# Patient Record
Sex: FEMALE | Race: WHITE | NOT HISPANIC OR LATINO | Employment: FULL TIME | URBAN - METROPOLITAN AREA
[De-identification: names, ages, dates, MRNs, and addresses within clinical notes are randomized per-mention and may not be internally consistent; named-entity substitution may affect disease eponyms.]

---
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
## 2019-01-03 ENCOUNTER — MEDICAL CORRESPONDENCE (OUTPATIENT)
Dept: HEALTH INFORMATION MANAGEMENT | Facility: CLINIC | Age: 19
End: 2019-01-03

## 2019-01-03 ENCOUNTER — TRANSFERRED RECORDS (OUTPATIENT)
Dept: HEALTH INFORMATION MANAGEMENT | Facility: CLINIC | Age: 19
End: 2019-01-03

## 2019-02-14 ENCOUNTER — MYC REFILL (OUTPATIENT)
Dept: FAMILY MEDICINE | Facility: CLINIC | Age: 19
End: 2019-02-14

## 2019-02-15 NOTE — TELEPHONE ENCOUNTER
[phone]- david      Left message on answering machine for patient to call back.  Patient needs appt.     Thank you    Amanda UMANZOR RN

## 2019-02-18 NOTE — TELEPHONE ENCOUNTER
Patient states the prescription goes to another provider. This was a mistake to have it sent to .    Amanda UMANZOR RN

## 2019-10-10 NOTE — TELEPHONE ENCOUNTER
RECORDS RECEIVED FROM: Internal   DATE RECEIVED: 10.16.19   NOTES STATUS DETAILS   OFFICE NOTE from referring provider Internal 1.3.19 Francoise Aguayo   OFFICE NOTE from other specialist Internal 11.5.13 Dr. Germain  5.3.13 Dr. Germain   DISCHARGE SUMMARY from hospital N/A    DISCHARGE REPORT from the ER Internal 4.26.13 [facility] ED   OPERATIVE REPORT N/A    MEDICATION LIST Internal    MRI N/A    CT SCAN Care Everywhere Requested    1.22.19-[facility]   XRAYS (IMAGES & REPORTS) Internal/external   1.3.19 Right ankle  4.26.13 Right ankle

## 2019-10-16 ENCOUNTER — PRE VISIT (OUTPATIENT)
Dept: ORTHOPEDICS | Facility: CLINIC | Age: 19
End: 2019-10-16

## 2019-10-16 ENCOUNTER — OFFICE VISIT (OUTPATIENT)
Dept: ORTHOPEDICS | Facility: CLINIC | Age: 19
End: 2019-10-16
Payer: COMMERCIAL

## 2019-10-16 VITALS — BODY MASS INDEX: 29.77 KG/M2 | WEIGHT: 201 LBS | RESPIRATION RATE: 16 BRPM | HEIGHT: 69 IN

## 2019-10-16 DIAGNOSIS — G89.29 CHRONIC PAIN OF RIGHT ANKLE: Primary | ICD-10-CM

## 2019-10-16 DIAGNOSIS — M25.571 CHRONIC PAIN OF RIGHT ANKLE: Primary | ICD-10-CM

## 2019-10-16 RX ORDER — TRIAMCINOLONE ACETONIDE 55 UG/1
2 SPRAY, METERED NASAL DAILY
Status: ON HOLD | COMMUNITY
End: 2024-05-05

## 2019-10-16 RX ORDER — ESCITALOPRAM OXALATE 10 MG/1
TABLET ORAL
COMMUNITY
Start: 2019-09-16 | End: 2020-04-16

## 2019-10-16 ASSESSMENT — MIFFLIN-ST. JEOR: SCORE: 1746.11

## 2019-10-16 NOTE — PROGRESS NOTES
" Subjective:   Stacy Tejeda is a 19 year old female who presents with right medial ankle pain. In 2013 she jumped off a tire swing in 8 th grade, not sure how she fell. She is going to school.   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  Seen over at [facility].  Getting more difficult to get around.  Mostly doing walking around campus.    CT Ankle 1/21/2019  IMPRESSION:    1. Chronic appearing 0.4 x 0.2 x 0.4 cm subchondral cortical defect in the posterior medial tibial plafond and medial malleolus may represent old posttraumatic deformity. Mild marginal hypertrophic changes in the medial ankle mortise. No evidence of significant joint space narrowing or other evidence of significant arthropathy.    2. Normal variant os trigonum minimal cortical irregularity between the osseous fragment posterior talus.  Sometimes does PT exercises, seems plenty strong.    Background:   Date of injury: 2013   Duration of symptoms: 1 years  Mechanism of Injury: Chronic; Activity Related   Aggravating factors: walking, activity   Relieving Factors: massage  Prior Evaluation: Prior Physician Evalutation:  at [facility], X-rays and Injection 2/8/19    PAST MEDICAL, SOCIAL, SURGICAL AND FAMILY HISTORY: She  has a past medical history of Bordetella pertussis (4/28/2009) and Fracture of posterior malleolus (5/20/2013).  She  has no past surgical history on file.  Her family history includes Hypertension in her mother.  She reports that she has never smoked. She has never used smokeless tobacco. She reports that she does not drink alcohol or use drugs.    ALLERGIES: She is allergic to augmentin.    CURRENT MEDICATIONS: She has a current medication list which includes the following prescription(s): triamcinolone, buspirone, cetirizine, citalopram, and escitalopram.     REVIEW OF SYSTEMS: 10 point review of systems is negative except as noted above.     Exam:   Resp 16   Ht 1.753 m (5' 9\")   Wt 91.2 kg (201 lb)   BMI 29.68 kg/m           "   CONSTITUTIONAL: healthy, alert, no distress and cooperative  HEAD: Normocephalic. No masses, lesions, tenderness or abnormalities  SKIN: no suspicious lesions or rashes  GAIT: normal  NEUROLOGIC: Non-focal, Normal muscle tone and strength, reflexes normal, sensation grossly normal.  PSYCHIATRIC: affect normal/bright and mentation appears normal.    MUSCULOSKELETAL: right ankle pain    ANKLE  Inspection/Palpation: no ecchymosis, no swelling     Swelling: no swelling   Tender: posterior ankle, mild achilles tenderness     Non-tender: ATFL, CFL, PTFL, medial malleolus, deltoid ligament, anterior tib-fib ligament, no achilles tendon defect   Range of Motion: dorsiflexion: full, plantarflexion: full, inversion: full, eversion: full  Strength:dorsiflexion: 5/5, plantarflexion: 5/5, inversion: 5/5, eversion: 5/5   Special tests: negative anterior drawer, negative varus stress, negative valgus stress, negative forced external rotation, negative Jacob sign     FOOT  Inspection/Palpation:      Swelling: no swelling     Non-tender: promixal 5th metatarsal, 1st, 2nd, 3rd, 4th, 5th metatarsals, calcaneous, cuboid,  navicular, cuneiform lateral, cuneiform middle, cuneiform medial, metatarsal heads, peroneal tendon: at lateral malleolus, at cuboid, at proximal 5th metatarsal, posterior tibial tendon at medial malleolus, posterior tibial tendon at navicular, plantar fascia  Range of Motion: flexion of toes: full, extension of toes: full         Assessment/Plan:   Pt is a 18 yo white female with PMhx of right posterior ankle fracture presenting with chronic right ankle pain  1. Chronic right ankle pain- XR injection ordered  Had this procedure at [facility] and was able to get months of relief, here for orders to get repeat injection    RTC prn    X-RAY INTERPRETATION:   Reviewed past imaging from [facility]

## 2019-10-16 NOTE — LETTER
10/16/2019      RE: Stacy Tejeda  [address] [address]        Subjective:   Stacy Tejeda is a 19 year old female who presents with right medial ankle pain. In 2013 she jumped off a tire swing in 8 th grade, not sure how she fell. She is going to school.   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  Seen over at [facility].  Getting more difficult to get around.  Mostly doing walking around campus.    CT Ankle 1/21/2019  IMPRESSION:    1. Chronic appearing 0.4 x 0.2 x 0.4 cm subchondral cortical defect in the posterior medial tibial plafond and medial malleolus may represent old posttraumatic deformity. Mild marginal hypertrophic changes in the medial ankle mortise. No evidence of significant joint space narrowing or other evidence of significant arthropathy.    2. Normal variant os trigonum minimal cortical irregularity between the osseous fragment posterior talus.  Sometimes does PT exercises, seems plenty strong.    Background:   Date of injury: 2013   Duration of symptoms: 1 years  Mechanism of Injury: Chronic; Activity Related   Aggravating factors: walking, activity   Relieving Factors: massage  Prior Evaluation: Prior Physician Evalutation:  at [facility], X-rays and Injection 2/8/19    PAST MEDICAL, SOCIAL, SURGICAL AND FAMILY HISTORY: She  has a past medical history of Bordetella pertussis (4/28/2009) and Fracture of posterior malleolus (5/20/2013).  She  has no past surgical history on file.  Her family history includes Hypertension in her mother.  She reports that she has never smoked. She has never used smokeless tobacco. She reports that she does not drink alcohol or use drugs.    ALLERGIES: She is allergic to augmentin.    CURRENT MEDICATIONS: She has a current medication list which includes the following prescription(s): triamcinolone, buspirone, cetirizine, citalopram, and escitalopram.     REVIEW OF SYSTEMS: 10 point review of systems is negative except as noted above.      "Exam:   Resp 16   Ht 1.753 m (5' 9\")   Wt 91.2 kg (201 lb)   BMI 29.68 kg/m              CONSTITUTIONAL: healthy, alert, no distress and cooperative  HEAD: Normocephalic. No masses, lesions, tenderness or abnormalities  SKIN: no suspicious lesions or rashes  GAIT: normal  NEUROLOGIC: Non-focal, Normal muscle tone and strength, reflexes normal, sensation grossly normal.  PSYCHIATRIC: affect normal/bright and mentation appears normal.    MUSCULOSKELETAL: right ankle pain    ANKLE  Inspection/Palpation: no ecchymosis, no swelling     Swelling: no swelling   Tender: posterior ankle, mild achilles tenderness     Non-tender: ATFL, CFL, PTFL, medial malleolus, deltoid ligament, anterior tib-fib ligament, no achilles tendon defect   Range of Motion: dorsiflexion: full, plantarflexion: full, inversion: full, eversion: full  Strength:dorsiflexion: 5/5, plantarflexion: 5/5, inversion: 5/5, eversion: 5/5   Special tests: negative anterior drawer, negative varus stress, negative valgus stress, negative forced external rotation, negative Jacob sign     FOOT  Inspection/Palpation:      Swelling: no swelling     Non-tender: promixal 5th metatarsal, 1st, 2nd, 3rd, 4th, 5th metatarsals, calcaneous, cuboid,  navicular, cuneiform lateral, cuneiform middle, cuneiform medial, metatarsal heads, peroneal tendon: at lateral malleolus, at cuboid, at proximal 5th metatarsal, posterior tibial tendon at medial malleolus, posterior tibial tendon at navicular, plantar fascia  Range of Motion: flexion of toes: full, extension of toes: full         Assessment/Plan:   Pt is a 20 yo white female with PMhx of right posterior ankle fracture presenting with chronic right ankle pain  1. Chronic right ankle pain- XR injection ordered  Had this procedure at [facility] and was able to get months of relief, here for orders to get repeat injection    RTC prn    X-RAY INTERPRETATION:   Reviewed past imaging from [facility]    Maria Ines Lee MD    "

## 2019-10-31 ENCOUNTER — ANCILLARY PROCEDURE (OUTPATIENT)
Dept: GENERAL RADIOLOGY | Facility: CLINIC | Age: 19
End: 2019-10-31
Attending: FAMILY MEDICINE
Payer: COMMERCIAL

## 2019-10-31 DIAGNOSIS — M25.571 CHRONIC PAIN OF RIGHT ANKLE: ICD-10-CM

## 2019-10-31 DIAGNOSIS — G89.29 CHRONIC PAIN OF RIGHT ANKLE: ICD-10-CM

## 2019-10-31 RX ORDER — LIDOCAINE HYDROCHLORIDE 10 MG/ML
30 INJECTION, SOLUTION EPIDURAL; INFILTRATION; INTRACAUDAL; PERINEURAL ONCE
Status: COMPLETED | OUTPATIENT
Start: 2019-10-31 | End: 2019-10-31

## 2019-10-31 RX ORDER — BUPIVACAINE HYDROCHLORIDE 2.5 MG/ML
10 INJECTION, SOLUTION EPIDURAL; INFILTRATION; INTRACAUDAL ONCE
Status: COMPLETED | OUTPATIENT
Start: 2019-10-31 | End: 2019-10-31

## 2019-10-31 RX ORDER — IOPAMIDOL 408 MG/ML
20 INJECTION, SOLUTION INTRATHECAL ONCE
Status: COMPLETED | OUTPATIENT
Start: 2019-10-31 | End: 2019-10-31

## 2019-10-31 RX ORDER — TRIAMCINOLONE ACETONIDE 40 MG/ML
40 INJECTION, SUSPENSION INTRA-ARTICULAR; INTRAMUSCULAR ONCE
Status: COMPLETED | OUTPATIENT
Start: 2019-10-31 | End: 2019-10-31

## 2019-10-31 RX ADMIN — BUPIVACAINE HYDROCHLORIDE 2 ML: 2.5 INJECTION, SOLUTION EPIDURAL; INFILTRATION; INTRACAUDAL at 13:04

## 2019-10-31 RX ADMIN — IOPAMIDOL 2 ML: 408 INJECTION, SOLUTION INTRATHECAL at 13:04

## 2019-10-31 RX ADMIN — TRIAMCINOLONE ACETONIDE 40 MG: 40 INJECTION, SUSPENSION INTRA-ARTICULAR; INTRAMUSCULAR at 13:04

## 2019-10-31 RX ADMIN — LIDOCAINE HYDROCHLORIDE 5 ML: 10 INJECTION, SOLUTION EPIDURAL; INFILTRATION; INTRACAUDAL; PERINEURAL at 13:04

## 2020-04-16 ENCOUNTER — HOSPITAL ENCOUNTER (EMERGENCY)
Facility: CLINIC | Age: 20
Discharge: HOME | End: 2020-04-16
Attending: EMERGENCY MEDICINE | Admitting: EMERGENCY MEDICINE
Payer: COMMERCIAL

## 2020-04-16 ENCOUNTER — APPOINTMENT (OUTPATIENT)
Dept: GENERAL RADIOLOGY | Facility: CLINIC | Age: 20
End: 2020-04-16
Attending: EMERGENCY MEDICINE
Payer: COMMERCIAL

## 2020-04-16 VITALS
WEIGHT: 190 LBS | RESPIRATION RATE: 17 BRPM | SYSTOLIC BLOOD PRESSURE: 139 MMHG | DIASTOLIC BLOOD PRESSURE: 94 MMHG | HEIGHT: 68 IN | TEMPERATURE: 97.9 F | HEART RATE: 112 BPM | BODY MASS INDEX: 28.79 KG/M2 | OXYGEN SATURATION: 96 %

## 2020-04-16 DIAGNOSIS — S61.340A: ICD-10-CM

## 2020-04-16 PROCEDURE — 99283 EMERGENCY DEPT VISIT LOW MDM: CPT | Mod: 25 | Performed by: EMERGENCY MEDICINE

## 2020-04-16 PROCEDURE — 25000128 H RX IP 250 OP 636: Performed by: EMERGENCY MEDICINE

## 2020-04-16 PROCEDURE — 25000125 ZZHC RX 250

## 2020-04-16 PROCEDURE — 73140 X-RAY EXAM OF FINGER(S): CPT | Mod: RT

## 2020-04-16 PROCEDURE — 90715 TDAP VACCINE 7 YRS/> IM: CPT | Performed by: EMERGENCY MEDICINE

## 2020-04-16 PROCEDURE — 25000128 H RX IP 250 OP 636

## 2020-04-16 PROCEDURE — 99284 EMERGENCY DEPT VISIT MOD MDM: CPT | Mod: Z6 | Performed by: EMERGENCY MEDICINE

## 2020-04-16 PROCEDURE — 90471 IMMUNIZATION ADMIN: CPT | Performed by: EMERGENCY MEDICINE

## 2020-04-16 RX ORDER — BUPIVACAINE HYDROCHLORIDE 2.5 MG/ML
10 INJECTION, SOLUTION INFILTRATION; PERINEURAL ONCE
Status: COMPLETED | OUTPATIENT
Start: 2020-04-16 | End: 2020-04-16

## 2020-04-16 RX ORDER — LIDOCAINE HYDROCHLORIDE 10 MG/ML
INJECTION, SOLUTION EPIDURAL; INFILTRATION; INTRACAUDAL; PERINEURAL
Status: COMPLETED
Start: 2020-04-16 | End: 2020-04-16

## 2020-04-16 RX ORDER — BUPIVACAINE HYDROCHLORIDE 2.5 MG/ML
INJECTION, SOLUTION EPIDURAL; INFILTRATION; INTRACAUDAL
Status: COMPLETED
Start: 2020-04-16 | End: 2020-04-16

## 2020-04-16 RX ORDER — LIDOCAINE HYDROCHLORIDE 10 MG/ML
10 INJECTION, SOLUTION INFILTRATION; PERINEURAL ONCE
Status: COMPLETED | OUTPATIENT
Start: 2020-04-16 | End: 2020-04-16

## 2020-04-16 RX ADMIN — CLOSTRIDIUM TETANI TOXOID ANTIGEN (FORMALDEHYDE INACTIVATED), CORYNEBACTERIUM DIPHTHERIAE TOXOID ANTIGEN (FORMALDEHYDE INACTIVATED), BORDETELLA PERTUSSIS TOXOID ANTIGEN (GLUTARALDEHYDE INACTIVATED), BORDETELLA PERTUSSIS FILAMENTOUS HEMAGGLUTININ ANTIGEN (FORMALDEHYDE INACTIVATED), BORDETELLA PERTUSSIS PERTACTIN ANTIGEN, AND BORDETELLA PERTUSSIS FIMBRIAE 2/3 ANTIGEN 0.5 ML: 5; 2; 2.5; 5; 3; 5 INJECTION, SUSPENSION INTRAMUSCULAR at 22:42

## 2020-04-16 RX ADMIN — LIDOCAINE HYDROCHLORIDE 10 ML: 10 INJECTION, SOLUTION EPIDURAL; INFILTRATION; INTRACAUDAL; PERINEURAL at 22:43

## 2020-04-16 RX ADMIN — BUPIVACAINE HYDROCHLORIDE 25 MG: 2.5 INJECTION, SOLUTION EPIDURAL; INFILTRATION; INTRACAUDAL at 22:43

## 2020-04-16 RX ADMIN — LIDOCAINE HYDROCHLORIDE 10 ML: 10 INJECTION, SOLUTION INFILTRATION; PERINEURAL at 22:43

## 2020-04-16 RX ADMIN — BUPIVACAINE HYDROCHLORIDE 25 MG: 2.5 INJECTION, SOLUTION INFILTRATION; PERINEURAL at 22:43

## 2020-04-16 ASSESSMENT — MIFFLIN-ST. JEOR: SCORE: 1680.33

## 2020-04-16 NOTE — ED AVS SNAPSHOT
[facility], [place], Emergency Department  [address] [place] [address]  Phone:  [phone]                                    Stacy Tejeda   MRN: [id number]    Department:  [facility], Emergency Department   Date of Visit:  4/16/2020           After Visit Summary Signature Page    I have received my discharge instructions, and my questions have been answered. I have discussed any challenges I see with this plan with the nurse or doctor.    ..........................................................................................................................................  Patient/Patient Representative Signature      ..........................................................................................................................................  Patient Representative Print Name and Relationship to Patient    ..................................................               ................................................  Date                                   Time    ..........................................................................................................................................  Reviewed by Signature/Title    ...................................................              ..............................................  Date                                               Time          22EPIC Rev 08/18

## 2020-04-17 NOTE — ED PROVIDER NOTES
[place] EMERGENCY DEPARTMENT ([facility])  April 16, 2020  History     Chief Complaint   Patient presents with     Hand Injury     HPI  Stacy Tejeda is a 19 year old female who presents to the Emergency Department with a foreign body stuck in her right index finger.  Patient reports approximately 20 minutes prior to arrival she was using a sewing machine and looked away for a second and accidentally had the sewing needle go into her right index finger right at the nail superficially into the skin.  She had her fiancé attempt to remove this unsuccessfully.  Thus she presents here.  She does not have significant pain at this time.  She denies any numbness, tingling, or weakness.  She has full range of motion of the finger.  It appears to be superficially through the nail and skin.  No active bleeding currently.  She is not on any anticoagulation. Last tetanus was in 2010.  She denies any other injury.  She is otherwise healthy.      PAST MEDICAL HISTORY:   Past Medical History:   Diagnosis Date     Anxiety      Bordetella pertussis 4/28/2009     Depressive disorder      Fracture of posterior malleolus 5/20/2013       PAST SURGICAL HISTORY: History reviewed. No pertinent surgical history.    Past medical history, past surgical history, medications, and allergies were reviewed with the patient. Additional pertinent items: None    FAMILY HISTORY:   Family History   Problem Relation Age of Onset     Hypertension Mother         borderline       SOCIAL HISTORY:   Social History     Tobacco Use     Smoking status: Never Smoker     Smokeless tobacco: Never Used   Substance Use Topics     Alcohol use: No     Social history was reviewed with the patient. Additional pertinent items: None      Patient's Medications   New Prescriptions    No medications on file   Previous Medications    BUSPIRONE (BUSPAR) 15 MG TABLET    Take 15 mg by mouth 3 times daily    TRIAMCINOLONE (NASACORT) 55 MCG/ACT NASAL AEROSOL    Spray  "2 sprays into both nostrils daily   Modified Medications    No medications on file   Discontinued Medications    CETIRIZINE (ZYRTEC) 10 MG TABLET    Take 10 mg by mouth daily.    CITALOPRAM (CELEXA) 10 MG TABLET    Take 10 mg by mouth daily    ESCITALOPRAM (LEXAPRO) 10 MG TABLET              Allergies   Allergen Reactions     Augmentin Nausea and Vomiting     Throws up        Review of Systems  A complete review of systems was performed with pertinent positives and negatives noted in the HPI, and all other systems negative.    Physical Exam   BP: (!) 139/94  Pulse: 112  Temp: 97.9  F (36.6  C)  Resp: 17  Height: 172.7 cm (5' 8\")  Weight: 86.2 kg (190 lb)  SpO2: 96 %      Physical Exam  Vitals signs reviewed.   Constitutional:       General: She is not in acute distress.     Appearance: She is well-developed.   HENT:      Head: Normocephalic and atraumatic.      Mouth/Throat:      Mouth: Mucous membranes are moist.   Eyes:      Extraocular Movements: Extraocular movements intact.      Pupils: Pupils are equal, round, and reactive to light.   Neck:      Musculoskeletal: Normal range of motion and neck supple.   Cardiovascular:      Rate and Rhythm: Normal rate and regular rhythm.      Pulses: Normal pulses.      Heart sounds: Normal heart sounds. No murmur.   Pulmonary:      Effort: Pulmonary effort is normal. No respiratory distress.      Breath sounds: Normal breath sounds. No wheezing or rales.   Abdominal:      General: Bowel sounds are normal. There is no distension.      Palpations: Abdomen is soft.      Tenderness: There is no abdominal tenderness.   Musculoskeletal: Normal range of motion.         General: No tenderness.      Comments: Patient has a sewing needle stuck at an angle into her right index finger nail superficially in the skin as well.  No active bleeding.  She has full range of motion of the right index finger at each MCP, PIP, and DIP joint without any pain to resistance tested individually.  " Capillary refill is less than 2 seconds.  2+ radial pulses bilaterally.  Otherwise no evidence of injury or other foreign bodies.  Compartments are soft and compressible.  She does not have any bony tenderness of the right index finger.   Skin:     General: Skin is warm and dry.      Capillary Refill: Capillary refill takes less than 2 seconds.      Findings: No rash.   Neurological:      General: No focal deficit present.      Mental Status: She is alert and oriented to person, place, and time.      GCS: GCS eye subscore is 4. GCS verbal subscore is 5. GCS motor subscore is 6.      Cranial Nerves: No cranial nerve deficit.      Sensory: No sensory deficit.      Motor: No weakness.   Psychiatric:         Mood and Affect: Mood normal.         ED Course        [facility], [place]    Foreign Body Removal    Date/Time: 5/16/2020 10:48 PM  Performed by: Nataly Shelley MD  Authorized by: Nataly Shelley MD       LOCATION     Location:  Finger    Finger location:  R index finger    Depth:  Subungual    Tendon involvement:  None      PRE-PROCEDURE DETAILS     Imaging:  X-ray    Neurovascular status: intact    ANESTHESIA (see MAR for exact dosages)     Anesthesia method:  Nerve block    Block location:  Right index finger digital block    Block needle gauge:  27 G    Block anesthetic:  Bupivacaine 0.25% w/o epi    Block technique:  Digital ring block    Block injection procedure:  Anatomic landmarks identified, introduced needle, incremental injection, negative aspiration for blood and anatomic landmarks palpated    Block outcome:  Anesthesia achieved      PROCEDURE TYPE     Procedure complexity:  Simple      PROCEDURE DETAILS     Incision length:  No incision needed    Localization method:  Visualized    Dissection of underlying tissues: no      Bloodless field: yes      Removal mechanism:  Hemostat    Foreign bodies recovered:  1    Description:  Sewing needle with thread intact     Intact foreign body removal: yes      POST-PROCEDURE DETAILS     Neurovascular status: intact      Confirmation:  No additional foreign bodies on visualization    Repair method: no closure needed. Small amount of bleeding stopped easily with pressure.    Dressing:  Non-adherent dressing                           XR Finger Right G/E 2 Views   Final Result   IMPRESSION: Metallic sewing needle seen through distal tip of the index finger. No definite osseous involvement. No acute fracture or dislocation.             Medications - No data to display          Assessments & Plan (with Medical Decision Making)   Patient presents with a sewing needle superficially through her right index finger nail into the skin very superficially.  She attempted to remove this at home without success.  Her tetanus was updated here as her last tetanus shot was in 2010 per our records.  There is no bleeding at this time.  She is neurovascularly intact.  X-ray was obtained which showed the foreign body superficially and there was no evidence of bone involvement or fracture.  No other foreign bodies noted.  Digital block was performed as described above with bupivacaine.  Then the needle was pulled out with a hemostat without difficulty.  There was some slight bleeding with this that stopped easily with pressure.  We did irrigate out the small wound.  There is a slight hole in the nail however no significant bleeding at this time and no need for repair of the nailbed.  We discussed that at this point I would not place her on empiric antibiotics as this is the very clean wound but did discuss that she should return if she developed any signs or symptoms of infection.  She was also advised that she should return if she developed any significant bleeding, fever, redness, swelling, purulent drainage, worsening pain or inability to move the finger any new or worsening concerns.  She voiced understanding and was in agreement with this plan.  She was  discharged home in stable condition.    I have reviewed the nursing notes.    I have reviewed the findings, diagnosis, plan and need for follow up with the patient.    Discharge Medication List as of 4/16/2020 10:38 PM          Final diagnoses:   Puncture wound of right index finger with foreign body and damage to nail, initial encounter       4/16/2020   [facility], [place], EMERGENCY DEPARTMENT     Nataly Shelley MD  05/16/20 [id number]

## 2020-04-17 NOTE — DISCHARGE INSTRUCTIONS
Return to the ED if you develop significant bleeding, fever, signs of infection including redness, swelling, pus drainage, worsening pain or inability to move finger, or any new or worsening concerns.

## 2022-11-08 ENCOUNTER — HOSPITAL ENCOUNTER (EMERGENCY)
Facility: CLINIC | Age: 22
Discharge: HOME | End: 2022-11-08
Attending: PHYSICIAN ASSISTANT | Admitting: PHYSICIAN ASSISTANT
Payer: COMMERCIAL

## 2022-11-08 VITALS
OXYGEN SATURATION: 99 % | SYSTOLIC BLOOD PRESSURE: 144 MMHG | RESPIRATION RATE: 16 BRPM | HEART RATE: 116 BPM | DIASTOLIC BLOOD PRESSURE: 96 MMHG | HEIGHT: 68 IN | WEIGHT: 220 LBS | TEMPERATURE: 100.6 F | BODY MASS INDEX: 33.34 KG/M2

## 2022-11-08 DIAGNOSIS — Z20.828 EXPOSURE TO INFLUENZA: ICD-10-CM

## 2022-11-08 DIAGNOSIS — H66.003 NON-RECURRENT ACUTE SUPPURATIVE OTITIS MEDIA OF BOTH EARS WITHOUT SPONTANEOUS RUPTURE OF TYMPANIC MEMBRANES: ICD-10-CM

## 2022-11-08 DIAGNOSIS — J11.1 INFLUENZA-LIKE ILLNESS: ICD-10-CM

## 2022-11-08 PROCEDURE — G0463 HOSPITAL OUTPT CLINIC VISIT: HCPCS | Performed by: PHYSICIAN ASSISTANT

## 2022-11-08 PROCEDURE — 99213 OFFICE O/P EST LOW 20 MIN: CPT | Performed by: PHYSICIAN ASSISTANT

## 2022-11-08 RX ORDER — CEFDINIR 300 MG/1
300 CAPSULE ORAL 2 TIMES DAILY
Qty: 20 CAPSULE | Refills: 0 | Status: SHIPPED | OUTPATIENT
Start: 2022-11-08 | End: 2022-11-18

## 2022-11-08 ASSESSMENT — ENCOUNTER SYMPTOMS
SORE THROAT: 1
MYALGIAS: 1
WHEEZING: 0
RHINORRHEA: 1
GASTROINTESTINAL NEGATIVE: 1
CARDIOVASCULAR NEGATIVE: 1
SHORTNESS OF BREATH: 0
SINUS PAIN: 0
EYES NEGATIVE: 1
HEADACHES: 1
FEVER: 1
COUGH: 1

## 2022-11-08 NOTE — ED PROVIDER NOTES
History     Chief Complaint   Patient presents with     Otalgia     Came down with a cold Friday, reports bilateral intermittent ear pain and  ears are plugged.     HPI  Stacy Avilez is a 22 year old female who presents the urgent care with URI symptoms that started 5 days ago.  Patient with bilateral intermittent ear pain on and off and plugged sensation since.  She has fevers that started the past few days and have been on and off.  She denies any known exposures.  Patient has been exposed to influenza through her group of friends but declined testing today stating that its not can to change anything since she is outside of the treatment window.  She states COVID test at home is negative    Allergies:  Allergies   Allergen Reactions     Augmentin Nausea and Vomiting     Throws up       Problem List:    Patient Active Problem List    Diagnosis Date Noted     White coat hypertension 08/29/2013     Priority: Medium     Elevated blood pressure reading without diagnosis of hypertension 08/29/2013     Priority: Medium     Seasonal allergic rhinitis 08/29/2013     Priority: Medium        Past Medical History:    Past Medical History:   Diagnosis Date     Anxiety      Bordetella pertussis 4/28/2009     Depressive disorder      Fracture of posterior malleolus 5/20/2013       Past Surgical History:    No past surgical history on file.    Family History:    Family History   Problem Relation Age of Onset     Hypertension Mother         borderline       Social History:  Marital Status:   [2]  Social History     Tobacco Use     Smoking status: Never     Smokeless tobacco: Never   Substance Use Topics     Alcohol use: No     Drug use: No        Medications:    cefdinir (OMNICEF) 300 MG capsule  busPIRone (BUSPAR) 15 MG tablet  triamcinolone (NASACORT) 55 MCG/ACT nasal aerosol          Review of Systems   Constitutional: Positive for fever.   HENT: Positive for congestion, ear pain, rhinorrhea and sore throat. Negative  "for sinus pain.    Eyes: Negative.    Respiratory: Positive for cough. Negative for shortness of breath and wheezing.    Cardiovascular: Negative.    Gastrointestinal: Negative.    Musculoskeletal: Positive for myalgias.   Neurological: Positive for headaches.   All other systems reviewed and are negative.      Physical Exam   BP: (!) 144/96  Pulse: 116  Temp: (!) 100.6  F (38.1  C)  Resp: 16  Height: 172.7 cm (5' 8\")  Weight: 99.8 kg (220 lb)  SpO2: 99 %      Physical Exam  Vitals and nursing note reviewed.   Constitutional:       General: She is not in acute distress.     Appearance: Normal appearance. She is normal weight. She is not toxic-appearing.   HENT:      Right Ear: Ear canal normal. Tympanic membrane is erythematous and bulging.      Left Ear: Ear canal normal. Tympanic membrane is erythematous and bulging.      Mouth/Throat:      Mouth: Mucous membranes are moist.      Pharynx: Oropharynx is clear. Posterior oropharyngeal erythema present. No oropharyngeal exudate.   Eyes:      General: No scleral icterus.     Extraocular Movements: Extraocular movements intact.      Conjunctiva/sclera: Conjunctivae normal.      Pupils: Pupils are equal, round, and reactive to light.   Cardiovascular:      Rate and Rhythm: Normal rate and regular rhythm.      Heart sounds: Normal heart sounds.   Pulmonary:      Effort: Pulmonary effort is normal.      Breath sounds: Normal breath sounds.   Musculoskeletal:      Cervical back: Neck supple. No rigidity or tenderness.   Lymphadenopathy:      Cervical: Cervical adenopathy present.   Skin:     General: Skin is warm.      Capillary Refill: Capillary refill takes less than 2 seconds.      Findings: No rash.   Neurological:      General: No focal deficit present.      Mental Status: She is alert and oriented to person, place, and time.   Psychiatric:         Mood and Affect: Mood normal.         Behavior: Behavior normal.         Thought Content: Thought content normal.         " Judgment: Judgment normal.         ED Course                 Procedures             Critical Care time:  none               No results found for this or any previous visit (from the past 24 hour(s)).    Medications - No data to display    Assessments & Plan (with Medical Decision Making)     I have reviewed the nursing notes.    I have reviewed the findings, diagnosis, plan and need for follow up with the patient.    Stacy Avilez is a 22 year old female who presents the urgent care with URI symptoms that started 5 days ago.  Patient with bilateral intermittent ear pain on and off and plugged sensation since.  She has fevers that started the past few days and have been on and off.  She denies any known exposures.  Patient has been exposed to influenza through her group of friends but declined testing today stating that its not can to change anything since she is outside of the treatment window.  She states COVID test at home is negative    See exam findings above.  Prescription cefdinir sent to the pharmacy for superior to otitis media of both ears.  She also has influenza-like illness that is likely related to her exposure to influenza from her friends.  Symptomatic treatment discussed.  Patient in agreement this plan and discharged in stable condition.    Discharge Medication List as of 11/8/2022  3:35 PM      START taking these medications    Details   cefdinir (OMNICEF) 300 MG capsule Take 1 capsule (300 mg) by mouth 2 times daily for 10 days, Disp-20 capsule, R-0, E-Prescribe             Final diagnoses:   Influenza-like illness   Non-recurrent acute suppurative otitis media of both ears without spontaneous rupture of tympanic membranes   Exposure to influenza       11/8/2022   [facility] EMERGENCY DEPT

## 2022-11-08 NOTE — DISCHARGE INSTRUCTIONS
Use medication as directed.    Follow up with PCP for recheck in 2 weeks, return sooner if symptoms worsen or change.    Increase fluids, nasal saline sprays, cool humidifier, Tylenol and ibuprofen over-the-counter as needed for fevers and pain.  Warm compresses to ears can all be beneficial.    Consider self contagious until you are fever free for 24 hours.  This could be influenza A on top of bilateral otitis media.    The emergency department symptoms worsen or change    Patient voiced understanding of instructions given.

## 2023-08-29 ENCOUNTER — OFFICE VISIT (OUTPATIENT)
Dept: PODIATRY | Facility: CLINIC | Age: 23
End: 2023-08-29
Payer: COMMERCIAL

## 2023-08-29 VITALS — DIASTOLIC BLOOD PRESSURE: 85 MMHG | HEART RATE: 81 BPM | SYSTOLIC BLOOD PRESSURE: 143 MMHG

## 2023-08-29 DIAGNOSIS — M19.071 ARTHRITIS OF ANKLE, RIGHT: Primary | ICD-10-CM

## 2023-08-29 PROBLEM — E28.2 PCOS (POLYCYSTIC OVARIAN SYNDROME): Status: ACTIVE | Noted: 2023-08-29

## 2023-08-29 PROCEDURE — 99203 OFFICE O/P NEW LOW 30 MIN: CPT | Performed by: PODIATRIST

## 2023-08-29 RX ORDER — LETROZOLE 2.5 MG/1
TABLET, FILM COATED ORAL
Status: ON HOLD | COMMUNITY
End: 2024-05-05

## 2023-08-29 RX ORDER — CEPHALEXIN 500 MG/1
CAPSULE ORAL
Status: ON HOLD | COMMUNITY
End: 2024-05-05

## 2023-08-29 RX ORDER — ESCITALOPRAM OXALATE 20 MG/1
20 TABLET ORAL DAILY
Status: ON HOLD | COMMUNITY
End: 2024-05-05

## 2023-08-29 RX ORDER — PROGESTERONE 200 MG/1
CAPSULE ORAL
Status: ON HOLD | COMMUNITY
Start: 2022-08-16 | End: 2024-07-23

## 2023-08-29 RX ORDER — LABETALOL 100 MG/1
100 TABLET, FILM COATED ORAL 2 TIMES DAILY
Status: ON HOLD | COMMUNITY
End: 2024-05-05

## 2023-08-29 RX ORDER — TRAZODONE HYDROCHLORIDE 100 MG/1
100 TABLET ORAL
Status: ON HOLD | COMMUNITY
Start: 2022-05-11 | End: 2024-05-05

## 2023-08-29 NOTE — PROGRESS NOTES
Subjective:    Patient seen today for right ankle arthritis.  Pain aggravated by activity and relieved by rest.  Patient injured this in 2013.  Has had pain ever since.  States it is deep inside her ankle more medial.  Had an injection several years ago and had no pain for a few years.  Recently she has become more active and is painful again.  Denies edema numbness or weakness.      ROS:  see above         Allergies   Allergen Reactions    Amoxicillin-Pot Clavulanate Nausea and Vomiting     Throws up       Current Outpatient Medications   Medication Sig Dispense Refill    progesterone (PROMETRIUM) 200 MG capsule TAKE 2 CAPS BY MOUTH AT BEDTIME ON CYCLE DAYS PEAK PLUS 3 THRU PEAK PLUS 12      traZODone (DESYREL) 100 MG tablet Take 100 mg by mouth      cephALEXin (KEFLEX) 500 MG capsule       escitalopram (LEXAPRO) 20 MG tablet Take 20 mg by mouth daily      labetalol (NORMODYNE) 100 MG tablet Take 100 mg by mouth 2 times daily      letrozole (FEMARA) 2.5 MG tablet       pyridOXINE (VITAMIN B6) 100 MG TABS       triamcinolone (NASACORT) 55 MCG/ACT nasal aerosol Spray 2 sprays into both nostrils daily         Patient Active Problem List   Diagnosis    White coat hypertension    Elevated blood pressure reading without diagnosis of hypertension    Seasonal allergic rhinitis    PCOS (polycystic ovarian syndrome)       Past Medical History:   Diagnosis Date    Anxiety     Bordetella pertussis 4/28/2009    Depressive disorder     Fracture of posterior malleolus 5/20/2013       No past surgical history on file.    Family History   Problem Relation Age of Onset    Hypertension Mother         borderline       Social History     Tobacco Use    Smoking status: Never    Smokeless tobacco: Never   Substance Use Topics    Alcohol use: No         Exam:    Vitals: BP (!) 143/85   Pulse 81   BMI: There is no height or weight on file to calculate BMI.  Height: Data Unavailable    Constitutional/ general:  Pt is in no apparent  distress, appears well-nourished.  Cooperative with history and physical exam.     Psych:  The patient answered questions appropriately.  Normal affect.  Seems to have reasonable expectations, in terms of treatment.     Lungs:  Non labored breathing, non labored speech. No cough.  No audible wheezing. Even, quiet breathing.       Vascular:  positive pedal pulses bilaterally for both the DP and PT arteries.  CFT < 3 sec.  negative ankle edema.  positive pedal hair growth.    Neuro:  Alert and oriented x 3. Coordinated gait.  Light touch sensation is intact      Derm: Normal texture and turgor.  No erythema, ecchymosis, or cyanosis.      Musculoskeletal:    No gross deformities.   Normal arch .  Muscle compartments intact.   Normal ROM all forefoot and rearfoot joints.  No pain around right ankle at this time.  No edema.  No pain on palpation of tendons.    Radiographic Exam:    CT Ankle Right w/o Contrast  Order: [id number]  Impression    IMPRESSION:  1. Chronic appearing 0.4 x 0.2 x 0.4 cm subchondral cortical defect in the posterior medial tibial plafond and medial malleolus may represent old posttraumatic deformity. Mild marginal hypertrophic changes in the medial ankle mortise. No evidence of significant joint space narrowing or other evidence of significant arthropathy.  2. Normal variant os trigonum minimal cortical irregularity between the osseous fragment posterior talus.  Narrative    TECHNIQUE:  Thin section axial scans were obtained through the right ankle. Sagittal and coronal reconstruction was performed without contrast.    COMPARISON: None.    FINDINGS: Normal variant os trigonum with mild cortical irregularity between the ossific fragment in posterior talus. Chronic appearing 0.4 x 0.2 x 0.4 cm subchondral cortical defect in the posterior medial tibial plafond . Mild marginal hypertrophic changes in the medial ankle mortise. No joint space narrowing in the ankle mortise. No osteochondral loose bodies.  No other evidence of significant arthropathy in the ankle. No evidence of acute fractures.  Exam End: 01/21/19  4:54 PM    Specimen Collected: 01/21/19  4:43 PM Last Resulted: 01/22/19  8:05 AM   Received From: [facility]  Result Received: 08/29/23  2:10 PM     Past x-rays from injury noted    Assessment:  Right ankle arthritis     Plan:  X-rays and CT scan from past personally reviewed.  Discussed with patient wearing a good shoe at all times may be helpful for this.  I made suggestions.  We will take ibuprofen as needed for pain.  We will try to do lower impact activities.  She would like another injection as this was worked in the past.  We will place order to have this done.  RTC as needed      Edmond Roa DPM, FACFAS

## 2023-08-29 NOTE — LETTER
8/29/2023         RE: Stacy Avilez  [address] [address] [place] [address]  [place] [address]        Dear Colleague,    Thank you for referring your patient, Stacy Avilez, to the [facility]. Please see a copy of my visit note below.    Subjective:    Patient seen today for right ankle arthritis.  Pain aggravated by activity and relieved by rest.  Patient injured this in 2013.  Has had pain ever since.  States it is deep inside her ankle more medial.  Had an injection several years ago and had no pain for a few years.  Recently she has become more active and is painful again.  Denies edema numbness or weakness.      ROS:  see above         Allergies   Allergen Reactions     Amoxicillin-Pot Clavulanate Nausea and Vomiting     Throws up       Current Outpatient Medications   Medication Sig Dispense Refill     progesterone (PROMETRIUM) 200 MG capsule TAKE 2 CAPS BY MOUTH AT BEDTIME ON CYCLE DAYS PEAK PLUS 3 THRU PEAK PLUS 12       traZODone (DESYREL) 100 MG tablet Take 100 mg by mouth       cephALEXin (KEFLEX) 500 MG capsule        escitalopram (LEXAPRO) 20 MG tablet Take 20 mg by mouth daily       labetalol (NORMODYNE) 100 MG tablet Take 100 mg by mouth 2 times daily       letrozole (FEMARA) 2.5 MG tablet        pyridOXINE (VITAMIN B6) 100 MG TABS        triamcinolone (NASACORT) 55 MCG/ACT nasal aerosol Spray 2 sprays into both nostrils daily         Patient Active Problem List   Diagnosis     White coat hypertension     Elevated blood pressure reading without diagnosis of hypertension     Seasonal allergic rhinitis     PCOS (polycystic ovarian syndrome)       Past Medical History:   Diagnosis Date     Anxiety      Bordetella pertussis 4/28/2009     Depressive disorder      Fracture of posterior malleolus 5/20/2013       No past surgical history on file.    Family History   Problem Relation Age of Onset     Hypertension Mother         borderline       Social History     Tobacco Use     Smoking  status: Never     Smokeless tobacco: Never   Substance Use Topics     Alcohol use: No         Exam:    Vitals: BP (!) 143/85   Pulse 81   BMI: There is no height or weight on file to calculate BMI.  Height: Data Unavailable    Constitutional/ general:  Pt is in no apparent distress, appears well-nourished.  Cooperative with history and physical exam.     Psych:  The patient answered questions appropriately.  Normal affect.  Seems to have reasonable expectations, in terms of treatment.     Lungs:  Non labored breathing, non labored speech. No cough.  No audible wheezing. Even, quiet breathing.       Vascular:  positive pedal pulses bilaterally for both the DP and PT arteries.  CFT < 3 sec.  negative ankle edema.  positive pedal hair growth.    Neuro:  Alert and oriented x 3. Coordinated gait.  Light touch sensation is intact      Derm: Normal texture and turgor.  No erythema, ecchymosis, or cyanosis.      Musculoskeletal:    No gross deformities.   Normal arch .  Muscle compartments intact.   Normal ROM all forefoot and rearfoot joints.  No pain around right ankle at this time.  No edema.  No pain on palpation of tendons.    Radiographic Exam:    CT Ankle Right w/o Contrast  Order: [id number]  Impression    IMPRESSION:  1. Chronic appearing 0.4 x 0.2 x 0.4 cm subchondral cortical defect in the posterior medial tibial plafond and medial malleolus may represent old posttraumatic deformity. Mild marginal hypertrophic changes in the medial ankle mortise. No evidence of significant joint space narrowing or other evidence of significant arthropathy.  2. Normal variant os trigonum minimal cortical irregularity between the osseous fragment posterior talus.  Narrative    TECHNIQUE:  Thin section axial scans were obtained through the right ankle. Sagittal and coronal reconstruction was performed without contrast.    COMPARISON: None.    FINDINGS: Normal variant os trigonum with mild cortical irregularity between the ossific  fragment in posterior talus. Chronic appearing 0.4 x 0.2 x 0.4 cm subchondral cortical defect in the posterior medial tibial plafond . Mild marginal hypertrophic changes in the medial ankle mortise. No joint space narrowing in the ankle mortise. No osteochondral loose bodies. No other evidence of significant arthropathy in the ankle. No evidence of acute fractures.  Exam End: 01/21/19  4:54 PM    Specimen Collected: 01/21/19  4:43 PM Last Resulted: 01/22/19  8:05 AM   Received From: [facility]  Result Received: 08/29/23  2:10 PM     Past x-rays from injury noted    Assessment:  Right ankle arthritis     Plan:  X-rays and CT scan from past personally reviewed.  Discussed with patient wearing a good shoe at all times may be helpful for this.  I made suggestions.  We will take ibuprofen as needed for pain.  We will try to do lower impact activities.  She would like another injection as this was worked in the past.  We will place order to have this done.  RTC as needed      Edmond Roa DPM, FACFAS        Again, thank you for allowing me to participate in the care of your patient.        Sincerely,        Edmond Roa DPM

## 2023-09-11 NOTE — PROGRESS NOTES
Stacy Avilez  :  2000  DOS: 9/15/2023  MRN: [id number]    Sports Medicine Clinic Procedure    Ultrasound Guided Right Tibiotalar Ankle Injection    Clinical History: Previous ankle injection was about 3-4 years ago. Got them more when she was in college. Would have lasting relief based on how active she was at the time. Here today for repeat injection.     Diagnosis:   1. Arthritis of ankle, right      Referring Physician: Dr Edmond Roa DPM  Medium Joint Injection/Arthrocentesis: R ankle    Date/Time: 9/15/2023 4:19 PM    Performed by: Garry Shook DO  Authorized by: Garry Shook DO    Indications:  Pain  Needle Size:  25 G  Guidance: ultrasound    Approach:  Dorsal  Location:  Ankle  Location comment:  Tibiotalar Joint  Site:  R ankle  Medications:  40 mg triamcinolone 40 MG/ML; 2 mL ROPivacaine 5 MG/ML  Outcome:  Tolerated well, no immediate complications  Procedure discussed: discussed risks, benefits, and alternatives    Timeout: timeout called immediately prior to procedure    Prep: patient was prepped and draped in usual sterile fashion     Ultrasound images of procedure were permanently stored.       Impression:  Successful Right intra-articular ankle injection.    Plan:  Follow with Dr Roa as previously discussed.  Expectations and goals of CSI reviewed  Often 2-3 days for steroid effect, and can take up to two weeks for maximum effect  We discussed modified progressive pain-free activity as tolerated  Do not overuse in first two weeks if feeling better due to concern for vulnerability while steroid is working  Supportive care reviewed  All questions were answered today  Contact us with additional questions or concerns  Signs and sx of concern reviewed      Garry Shook DO, CANAMAN  Primary Care Sports Medicine  [facility]

## 2023-09-15 ENCOUNTER — OFFICE VISIT (OUTPATIENT)
Dept: ORTHOPEDICS | Facility: CLINIC | Age: 23
End: 2023-09-15
Attending: PODIATRIST
Payer: COMMERCIAL

## 2023-09-15 VITALS — BODY MASS INDEX: 33.45 KG/M2 | WEIGHT: 220 LBS

## 2023-09-15 DIAGNOSIS — M19.071 ARTHRITIS OF ANKLE, RIGHT: ICD-10-CM

## 2023-09-15 PROCEDURE — 20606 DRAIN/INJ JOINT/BURSA W/US: CPT | Mod: RT | Performed by: FAMILY MEDICINE

## 2023-09-15 RX ORDER — TRIAMCINOLONE ACETONIDE 40 MG/ML
40 INJECTION, SUSPENSION INTRA-ARTICULAR; INTRAMUSCULAR
Status: DISCONTINUED | OUTPATIENT
Start: 2023-09-15 | End: 2024-05-05

## 2023-09-15 RX ORDER — ROPIVACAINE HYDROCHLORIDE 5 MG/ML
2 INJECTION, SOLUTION EPIDURAL; INFILTRATION; PERINEURAL
Status: DISCONTINUED | OUTPATIENT
Start: 2023-09-15 | End: 2024-05-05

## 2023-09-15 RX ADMIN — ROPIVACAINE HYDROCHLORIDE 2 ML: 5 INJECTION, SOLUTION EPIDURAL; INFILTRATION; PERINEURAL at 16:19

## 2023-09-15 RX ADMIN — TRIAMCINOLONE ACETONIDE 40 MG: 40 INJECTION, SUSPENSION INTRA-ARTICULAR; INTRAMUSCULAR at 16:19

## 2023-09-15 NOTE — LETTER
9/15/2023         RE: Stacy Avilez  [address] [address] [place] [address]  [place] [address]        Dear Colleague,    Thank you for referring your patient, Stacy Avilez, to the [facility]. Please see a copy of my visit note below.    Stacy Avilez  :  2000  DOS: 9/15/2023  MRN: [id number]    Sports Medicine Clinic Procedure    Ultrasound Guided Right Tibiotalar Ankle Injection    Clinical History: Previous ankle injection was about 3-4 years ago. Got them more when she was in college. Would have lasting relief based on how active she was at the time. Here today for repeat injection.     Diagnosis:   1. Arthritis of ankle, right      Referring Physician: CAROLINE Summers Joint Injection/Arthrocentesis: R ankle    Date/Time: 9/15/2023 4:19 PM    Performed by: Garry Shook DO  Authorized by: Garry Shook DO    Indications:  Pain  Needle Size:  25 G  Guidance: ultrasound    Approach:  Dorsal  Location:  Ankle  Location comment:  Tibiotalar Joint  Site:  R ankle  Medications:  40 mg triamcinolone 40 MG/ML; 2 mL ROPivacaine 5 MG/ML  Outcome:  Tolerated well, no immediate complications  Procedure discussed: discussed risks, benefits, and alternatives    Timeout: timeout called immediately prior to procedure    Prep: patient was prepped and draped in usual sterile fashion     Ultrasound images of procedure were permanently stored.       Impression:  Successful Right intra-articular ankle injection.    Plan:  Follow with Dr Roa as previously discussed.  Expectations and goals of CSI reviewed  Often 2-3 days for steroid effect, and can take up to two weeks for maximum effect  We discussed modified progressive pain-free activity as tolerated  Do not overuse in first two weeks if feeling better due to concern for vulnerability while steroid is working  Supportive care reviewed  All questions were answered today  Contact us with additional questions or  concerns  Signs and sx of concern reviewed      Garry Shook DO, CAQ  Primary Care Sports Medicine  [facility]          Again, thank you for allowing me to participate in the care of your patient.        Sincerely,        Garry Shook DO

## 2023-09-17 ENCOUNTER — HEALTH MAINTENANCE LETTER (OUTPATIENT)
Age: 23
End: 2023-09-17

## 2024-01-12 LAB
HEPATITIS C ANTIBODY (EXTERNAL): NONREACTIVE
HIV1+2 AB SERPL QL IA: NONREACTIVE
TREPONEMA PALLIDUM ANTIBODY (EXTERNAL): NONREACTIVE

## 2024-01-13 LAB
HEPATITIS B SURFACE ANTIGEN (EXTERNAL): NONREACTIVE
RUBELLA ANTIBODY IGG (EXTERNAL): NORMAL

## 2024-05-05 ENCOUNTER — HOSPITAL ENCOUNTER (OUTPATIENT)
Facility: HOSPITAL | Age: 24
Discharge: HOME | End: 2024-05-05
Attending: OBSTETRICS & GYNECOLOGY | Admitting: OBSTETRICS & GYNECOLOGY
Payer: COMMERCIAL

## 2024-05-05 VITALS
DIASTOLIC BLOOD PRESSURE: 80 MMHG | RESPIRATION RATE: 16 BRPM | TEMPERATURE: 98.1 F | SYSTOLIC BLOOD PRESSURE: 147 MMHG | HEIGHT: 68 IN | BODY MASS INDEX: 28.64 KG/M2 | WEIGHT: 189 LBS

## 2024-05-05 DIAGNOSIS — O23.599 BACTERIAL VAGINOSIS IN PREGNANCY: Primary | ICD-10-CM

## 2024-05-05 DIAGNOSIS — B96.89 BACTERIAL VAGINOSIS IN PREGNANCY: Primary | ICD-10-CM

## 2024-05-05 PROBLEM — Z36.89 ENCOUNTER FOR TRIAGE IN PREGNANT PATIENT: Status: ACTIVE | Noted: 2024-05-05

## 2024-05-05 LAB
ALBUMIN MFR UR ELPH: <6 MG/DL
ALBUMIN SERPL BCG-MCNC: 3.6 G/DL (ref 3.5–5.2)
ALBUMIN UR-MCNC: NEGATIVE MG/DL
ALP SERPL-CCNC: 83 U/L (ref 40–150)
ALT SERPL W P-5'-P-CCNC: 9 U/L (ref 0–50)
ANION GAP SERPL CALCULATED.3IONS-SCNC: 10 MMOL/L (ref 7–15)
APPEARANCE UR: CLEAR
AST SERPL W P-5'-P-CCNC: 16 U/L (ref 0–45)
BACTERIA #/AREA URNS HPF: ABNORMAL /HPF
BILIRUB SERPL-MCNC: <0.2 MG/DL
BILIRUB UR QL STRIP: NEGATIVE
BUN SERPL-MCNC: 14.7 MG/DL (ref 6–20)
CALCIUM SERPL-MCNC: 9.6 MG/DL (ref 8.6–10)
CHLORIDE SERPL-SCNC: 105 MMOL/L (ref 98–107)
CLUE CELLS: PRESENT
COLOR UR AUTO: COLORLESS
CREAT SERPL-MCNC: 0.6 MG/DL (ref 0.51–0.95)
CREAT UR-MCNC: 17.2 MG/DL
DEPRECATED HCO3 PLAS-SCNC: 22 MMOL/L (ref 22–29)
EGFRCR SERPLBLD CKD-EPI 2021: >90 ML/MIN/1.73M2
ERYTHROCYTE [DISTWIDTH] IN BLOOD BY AUTOMATED COUNT: 12.4 % (ref 10–15)
GLUCOSE SERPL-MCNC: 91 MG/DL (ref 70–99)
GLUCOSE UR STRIP-MCNC: NEGATIVE MG/DL
HCT VFR BLD AUTO: 33.9 % (ref 35–47)
HGB BLD-MCNC: 11.4 G/DL (ref 11.7–15.7)
HGB UR QL STRIP: NEGATIVE
KETONES UR STRIP-MCNC: ABNORMAL MG/DL
LEUKOCYTE ESTERASE UR QL STRIP: ABNORMAL
MCH RBC QN AUTO: 32.1 PG (ref 26.5–33)
MCHC RBC AUTO-ENTMCNC: 33.6 G/DL (ref 31.5–36.5)
MCV RBC AUTO: 96 FL (ref 78–100)
NITRATE UR QL: NEGATIVE
PH UR STRIP: 6.5 [PH] (ref 5–7)
PLATELET # BLD AUTO: 362 10E3/UL (ref 150–450)
POTASSIUM SERPL-SCNC: 3.8 MMOL/L (ref 3.4–5.3)
PROT SERPL-MCNC: 6.6 G/DL (ref 6.4–8.3)
PROT/CREAT 24H UR: NORMAL MG/G{CREAT}
RBC # BLD AUTO: 3.55 10E6/UL (ref 3.8–5.2)
RBC URINE: 2 /HPF
SODIUM SERPL-SCNC: 137 MMOL/L (ref 135–145)
SP GR UR STRIP: 1.01 (ref 1–1.03)
SQUAMOUS EPITHELIAL: 2 /HPF
TRANSITIONAL EPI: <1 /HPF
TRICHOMONAS, WET PREP: ABNORMAL
UROBILINOGEN UR STRIP-MCNC: <2 MG/DL
WBC # BLD AUTO: 13.5 10E3/UL (ref 4–11)
WBC URINE: 3 /HPF
WBC'S/HIGH POWER FIELD, WET PREP: ABNORMAL
YEAST, WET PREP: ABNORMAL

## 2024-05-05 PROCEDURE — 81001 URINALYSIS AUTO W/SCOPE: CPT | Performed by: OBSTETRICS & GYNECOLOGY

## 2024-05-05 PROCEDURE — 80053 COMPREHEN METABOLIC PANEL: CPT | Performed by: OBSTETRICS & GYNECOLOGY

## 2024-05-05 PROCEDURE — 87210 SMEAR WET MOUNT SALINE/INK: CPT | Performed by: OBSTETRICS & GYNECOLOGY

## 2024-05-05 PROCEDURE — 84156 ASSAY OF PROTEIN URINE: CPT | Performed by: OBSTETRICS & GYNECOLOGY

## 2024-05-05 PROCEDURE — 85027 COMPLETE CBC AUTOMATED: CPT | Performed by: OBSTETRICS & GYNECOLOGY

## 2024-05-05 PROCEDURE — 250N000013 HC RX MED GY IP 250 OP 250 PS 637: Performed by: OBSTETRICS & GYNECOLOGY

## 2024-05-05 PROCEDURE — 36415 COLL VENOUS BLD VENIPUNCTURE: CPT | Performed by: OBSTETRICS & GYNECOLOGY

## 2024-05-05 RX ORDER — METRONIDAZOLE 500 MG/1
500 TABLET ORAL EVERY 12 HOURS
Qty: 13 TABLET | Refills: 0 | Status: ON HOLD | OUTPATIENT
Start: 2024-05-05 | End: 2024-05-14

## 2024-05-05 RX ORDER — ASPIRIN 81 MG/1
81 TABLET, CHEWABLE ORAL DAILY
Status: ON HOLD | COMMUNITY
End: 2024-07-28

## 2024-05-05 RX ORDER — LIDOCAINE 40 MG/G
CREAM TOPICAL
Status: DISCONTINUED | OUTPATIENT
Start: 2024-05-05 | End: 2024-05-05 | Stop reason: HOSPADM

## 2024-05-05 RX ORDER — LABETALOL 100 MG/1
100 TABLET, FILM COATED ORAL 2 TIMES DAILY
Status: ON HOLD | COMMUNITY
End: 2024-07-28

## 2024-05-05 RX ORDER — METRONIDAZOLE 500 MG/1
500 TABLET ORAL ONCE
Status: COMPLETED | OUTPATIENT
Start: 2024-05-05 | End: 2024-05-05

## 2024-05-05 RX ADMIN — METRONIDAZOLE 500 MG: 500 TABLET ORAL at 18:00

## 2024-05-05 ASSESSMENT — ACTIVITIES OF DAILY LIVING (ADL)
ADLS_ACUITY_SCORE: 20

## 2024-05-05 NOTE — PLAN OF CARE
Data: Patient presented to [facility]: 2024  3:27 PM.  Reason for maternal/fetal assessment is uterine contractions. Patient reports intermittent cramping every couple minutes that started today at 1130. Patient denies leaking of vaginal fluid/rupture of membranes, vaginal bleeding, pelvic pressure, nausea, vomiting, headache, visual disturbances, epigastric or RUQ pain, significant edema. Patient reports fetal movement is normal. Patient is a 29w0d . Prenatal record reviewed. Pregnancy has been complicated by hypertension. Support person is present.     Vital signs  elevated BP, will do serial blood pressures . Patient reports no pain and is coping.     Action: Verbal consent for EFM. Triage assessment completed. Verbal orders for cervical exam, fetal monitoring, CBC, CMP, UA and PCR, and wet prep.

## 2024-05-05 NOTE — PROVIDER NOTIFICATION
05/05/24 1749   Provider Notification   Provider Name/Title Dr. Issa   Method of Notification Phone   Request Evaluate-Remote   Notification Reason Lab Results;Status Update     Dr. Issa updated on lab results. Verbal order placed for flagyl. MD will put in RX for outpatient metronidazole to  tomorrow. Will do one dose here before discharge. OK to discharge. Verbal orders for discharge.

## 2024-05-05 NOTE — PLAN OF CARE
Data: Patient assessed in the [facility] for uterine contractions. Cervix 0 cm dilated and 10% effaced. Fetal station -3. Membranes intact. Contractions are present. Contactions are  , 2-3 minutes apart, and last 30-40 seconds. Uterine assessment is mild by palpation during contractions and soft by palpation at rest. See flowsheets for fetal assessment documentation.     Action: Presumed adequate fetal oxygenation documented. Discharge instructions reviewed. Patient instructed to report change in fetal movement, vaginal leaking of fluid or bleeding, abdominal pain, or any concerns related to the pregnancy to provider/clinic.      Response: Orders to discharge home per Dr. Issa. Patient verbalized understanding of education and agreement with plan. Discharged to home at 1804.

## 2024-05-05 NOTE — DISCHARGE INSTRUCTIONS
metronidazole (flagyl) RX from pharmacy and take according to instructions. Call clinic if no relief from symptoms within a few days.

## 2024-05-14 ENCOUNTER — HOSPITAL ENCOUNTER (OUTPATIENT)
Facility: HOSPITAL | Age: 24
Discharge: HOME | End: 2024-05-14
Attending: OBSTETRICS & GYNECOLOGY | Admitting: OBSTETRICS & GYNECOLOGY
Payer: COMMERCIAL

## 2024-05-14 ENCOUNTER — APPOINTMENT (OUTPATIENT)
Dept: ULTRASOUND IMAGING | Facility: HOSPITAL | Age: 24
End: 2024-05-14
Attending: INTERNAL MEDICINE
Payer: COMMERCIAL

## 2024-05-14 ENCOUNTER — HOSPITAL ENCOUNTER (OUTPATIENT)
Facility: HOSPITAL | Age: 24
End: 2024-05-14
Admitting: OBSTETRICS & GYNECOLOGY
Payer: COMMERCIAL

## 2024-05-14 VITALS
DIASTOLIC BLOOD PRESSURE: 79 MMHG | BODY MASS INDEX: 28.95 KG/M2 | WEIGHT: 191 LBS | HEIGHT: 68 IN | TEMPERATURE: 99.1 F | RESPIRATION RATE: 16 BRPM | SYSTOLIC BLOOD PRESSURE: 133 MMHG

## 2024-05-14 PROCEDURE — 76819 FETAL BIOPHYS PROFIL W/O NST: CPT

## 2024-05-14 RX ORDER — LIDOCAINE 40 MG/G
CREAM TOPICAL
Status: DISCONTINUED | OUTPATIENT
Start: 2024-05-14 | End: 2024-05-14 | Stop reason: HOSPADM

## 2024-05-14 ASSESSMENT — ACTIVITIES OF DAILY LIVING (ADL)
ADLS_ACUITY_SCORE: 20

## 2024-05-14 NOTE — PLAN OF CARE
Data: Patient presented to [facility]: 2024  9:29 AM.  Reason for maternal/fetal assessment is decreased fetal movement. Patient reports noticing less movement on , 24. Patient denies uterine contractions, leaking of vaginal fluid/rupture of membranes, vaginal bleeding, abdominal pain, pelvic pressure, nausea, vomiting, headache, visual disturbances, epigastric or RUQ pain, significant edema. Patient reports fetal movement is decreased. Patient is a 30w2d .  Prenatal record reviewed. Pregnancy has been uncomplicated.    Vital signs wnl. Support person is present.     Action: Verbal consent for EFM. Triage assessment completed.     Response: Patient verbalized agreement with plan. Will contact Dr. Layton with update and further orders.

## 2024-05-14 NOTE — PROVIDER NOTIFICATION
05/14/24 1148   Provider Notification   Provider Name/Title Dr. Layton   Method of Notification Phone   Request Evaluate-Remote   Notification Reason Status Update     Dr. Carson called and notified of BPP 8/8, reactive NST and uterine irritability. Discussed treatment of BV last week. Pt reports not feeling contractions/ cramping and writer unable to palpate contractions. Verbal orders for discharge. MD would like pt to follow up in clinic in 1-2 weeks. Will review kick counts and PTL precautions.

## 2024-05-14 NOTE — PLAN OF CARE
Data: Patient assessed in the [facility] for decreased fetal movement. Cervical exam deferred. Membranes intact. Contractions are present. Contactions are 2 in 60 minutes with irritability, and last 60 seconds. Writer unable to palpate contractions, and patient does not report any cramping or contractions. See flowsheets for fetal assessment documentation.     Action: Presumed adequate fetal oxygenation documented. Discharge instructions reviewed. Patient instructed to report change in fetal movement, vaginal leaking of fluid or bleeding, abdominal pain, or any concerns related to the pregnancy to provider/clinic.      Response: Orders to discharge home per Dr. Layton. Patient verbalized understanding of education and agreement with plan. Discharged to home at 1210.

## 2024-05-14 NOTE — PROGRESS NOTES
Dr. Carson called and notified of patient arrival to triage and pt concern of decreased fetal movement. MD informed of Cat 1 tracing and pt verbalizing no other complaints, see triage note. Per MD, obtain BPP and call with results, order placed. MD okay with patient coming off monitor once reactive NST is obtained. RN to continue closely monitoring.     Sharita Marquez RN

## 2024-06-28 LAB — GROUP B STREPTOCOCCUS (EXTERNAL): NEGATIVE

## 2024-07-23 ENCOUNTER — HOSPITAL ENCOUNTER (INPATIENT)
Facility: HOSPITAL | Age: 24
LOS: 5 days | Discharge: HOME | End: 2024-07-28
Attending: OBSTETRICS & GYNECOLOGY | Admitting: OBSTETRICS & GYNECOLOGY
Payer: COMMERCIAL

## 2024-07-23 ENCOUNTER — TRANSFERRED RECORDS (OUTPATIENT)
Dept: HEALTH INFORMATION MANAGEMENT | Facility: CLINIC | Age: 24
End: 2024-07-23

## 2024-07-23 DIAGNOSIS — Z3A.40 40 WEEKS GESTATION OF PREGNANCY: Primary | ICD-10-CM

## 2024-07-23 DIAGNOSIS — O14.13 SEVERE PRE-ECLAMPSIA IN THIRD TRIMESTER: ICD-10-CM

## 2024-07-23 PROBLEM — Z34.90 PREGNANCY: Status: ACTIVE | Noted: 2024-07-23

## 2024-07-23 LAB
ABO/RH(D): NORMAL
ALBUMIN MFR UR ELPH: <6 MG/DL
ALBUMIN SERPL BCG-MCNC: 3.7 G/DL (ref 3.5–5.2)
ALP SERPL-CCNC: 152 U/L (ref 40–150)
ALT SERPL W P-5'-P-CCNC: 15 U/L (ref 0–50)
ANION GAP SERPL CALCULATED.3IONS-SCNC: 10 MMOL/L (ref 7–15)
ANTIBODY SCREEN: NEGATIVE
AST SERPL W P-5'-P-CCNC: 22 U/L (ref 0–45)
BILIRUB SERPL-MCNC: <0.2 MG/DL
BUN SERPL-MCNC: 10.7 MG/DL (ref 6–20)
CALCIUM SERPL-MCNC: 9 MG/DL (ref 8.8–10.4)
CHLORIDE SERPL-SCNC: 104 MMOL/L (ref 98–107)
CREAT SERPL-MCNC: 0.65 MG/DL (ref 0.51–0.95)
CREAT UR-MCNC: 54 MG/DL
EGFRCR SERPLBLD CKD-EPI 2021: >90 ML/MIN/1.73M2
ERYTHROCYTE [DISTWIDTH] IN BLOOD BY AUTOMATED COUNT: 13.5 % (ref 10–15)
GLUCOSE SERPL-MCNC: 90 MG/DL (ref 70–99)
HCO3 SERPL-SCNC: 22 MMOL/L (ref 22–29)
HCT VFR BLD AUTO: 36 % (ref 35–47)
HGB BLD-MCNC: 11.7 G/DL (ref 11.7–15.7)
HGB BLD-MCNC: 12 G/DL (ref 11.7–15.7)
HOLD SPECIMEN: NORMAL
LDH SERPL L TO P-CCNC: 182 U/L (ref 0–250)
MCH RBC QN AUTO: 31.6 PG (ref 26.5–33)
MCHC RBC AUTO-ENTMCNC: 33.3 G/DL (ref 31.5–36.5)
MCV RBC AUTO: 95 FL (ref 78–100)
PLATELET # BLD AUTO: 416 10E3/UL (ref 150–450)
POTASSIUM SERPL-SCNC: 4 MMOL/L (ref 3.4–5.3)
PROT SERPL-MCNC: 6.7 G/DL (ref 6.4–8.3)
PROT/CREAT 24H UR: NORMAL MG/G{CREAT}
RBC # BLD AUTO: 3.8 10E6/UL (ref 3.8–5.2)
SODIUM SERPL-SCNC: 136 MMOL/L (ref 135–145)
SPECIMEN EXPIRATION DATE: NORMAL
WBC # BLD AUTO: 12.9 10E3/UL (ref 4–11)

## 2024-07-23 PROCEDURE — 85018 HEMOGLOBIN: CPT | Performed by: OBSTETRICS & GYNECOLOGY

## 2024-07-23 PROCEDURE — 84156 ASSAY OF PROTEIN URINE: CPT | Performed by: OBSTETRICS & GYNECOLOGY

## 2024-07-23 PROCEDURE — G0463 HOSPITAL OUTPT CLINIC VISIT: HCPCS

## 2024-07-23 PROCEDURE — 82040 ASSAY OF SERUM ALBUMIN: CPT | Performed by: OBSTETRICS & GYNECOLOGY

## 2024-07-23 PROCEDURE — 83615 LACTATE (LD) (LDH) ENZYME: CPT | Performed by: OBSTETRICS & GYNECOLOGY

## 2024-07-23 PROCEDURE — 250N000013 HC RX MED GY IP 250 OP 250 PS 637: Performed by: OBSTETRICS & GYNECOLOGY

## 2024-07-23 PROCEDURE — 85041 AUTOMATED RBC COUNT: CPT | Performed by: OBSTETRICS & GYNECOLOGY

## 2024-07-23 PROCEDURE — 258N000003 HC RX IP 258 OP 636: Performed by: OBSTETRICS & GYNECOLOGY

## 2024-07-23 PROCEDURE — 36415 COLL VENOUS BLD VENIPUNCTURE: CPT | Performed by: OBSTETRICS & GYNECOLOGY

## 2024-07-23 PROCEDURE — 86900 BLOOD TYPING SEROLOGIC ABO: CPT | Performed by: OBSTETRICS & GYNECOLOGY

## 2024-07-23 PROCEDURE — 120N000001 HC R&B MED SURG/OB

## 2024-07-23 PROCEDURE — 250N000011 HC RX IP 250 OP 636: Performed by: OBSTETRICS & GYNECOLOGY

## 2024-07-23 PROCEDURE — 86780 TREPONEMA PALLIDUM: CPT | Performed by: OBSTETRICS & GYNECOLOGY

## 2024-07-23 RX ORDER — OXYTOCIN/0.9 % SODIUM CHLORIDE 30/500 ML
340 PLASTIC BAG, INJECTION (ML) INTRAVENOUS CONTINUOUS PRN
Status: DISCONTINUED | OUTPATIENT
Start: 2024-07-23 | End: 2024-07-25 | Stop reason: HOSPADM

## 2024-07-23 RX ORDER — ONDANSETRON 2 MG/ML
4 INJECTION INTRAMUSCULAR; INTRAVENOUS EVERY 6 HOURS PRN
Status: DISCONTINUED | OUTPATIENT
Start: 2024-07-23 | End: 2024-07-25 | Stop reason: HOSPADM

## 2024-07-23 RX ORDER — CITRIC ACID/SODIUM CITRATE 334-500MG
30 SOLUTION, ORAL ORAL
Status: DISCONTINUED | OUTPATIENT
Start: 2024-07-23 | End: 2024-07-25 | Stop reason: HOSPADM

## 2024-07-23 RX ORDER — MAGNESIUM SULFATE 4 G/50ML
4 INJECTION INTRAVENOUS
Status: DISCONTINUED | OUTPATIENT
Start: 2024-07-23 | End: 2024-07-28 | Stop reason: HOSPADM

## 2024-07-23 RX ORDER — NALOXONE HYDROCHLORIDE 0.4 MG/ML
0.4 INJECTION, SOLUTION INTRAMUSCULAR; INTRAVENOUS; SUBCUTANEOUS
Status: DISCONTINUED | OUTPATIENT
Start: 2024-07-23 | End: 2024-07-25 | Stop reason: HOSPADM

## 2024-07-23 RX ORDER — HYDROXYZINE HYDROCHLORIDE 50 MG/1
50 TABLET, FILM COATED ORAL EVERY 6 HOURS PRN
Status: COMPLETED | OUTPATIENT
Start: 2024-07-23 | End: 2024-07-24

## 2024-07-23 RX ORDER — SODIUM CHLORIDE, SODIUM LACTATE, POTASSIUM CHLORIDE, CALCIUM CHLORIDE 600; 310; 30; 20 MG/100ML; MG/100ML; MG/100ML; MG/100ML
10-125 INJECTION, SOLUTION INTRAVENOUS CONTINUOUS
Status: DISCONTINUED | OUTPATIENT
Start: 2024-07-23 | End: 2024-07-28 | Stop reason: HOSPADM

## 2024-07-23 RX ORDER — OXYTOCIN 10 [USP'U]/ML
10 INJECTION, SOLUTION INTRAMUSCULAR; INTRAVENOUS
Status: DISCONTINUED | OUTPATIENT
Start: 2024-07-23 | End: 2024-07-28 | Stop reason: HOSPADM

## 2024-07-23 RX ORDER — LABETALOL 100 MG/1
100 TABLET, FILM COATED ORAL 2 TIMES DAILY
Status: DISCONTINUED | OUTPATIENT
Start: 2024-07-23 | End: 2024-07-26

## 2024-07-23 RX ORDER — IBUPROFEN 800 MG/1
800 TABLET, FILM COATED ORAL
Status: DISCONTINUED | OUTPATIENT
Start: 2024-07-23 | End: 2024-07-28 | Stop reason: HOSPADM

## 2024-07-23 RX ORDER — LIDOCAINE 40 MG/G
CREAM TOPICAL
Status: DISCONTINUED | OUTPATIENT
Start: 2024-07-23 | End: 2024-07-28 | Stop reason: HOSPADM

## 2024-07-23 RX ORDER — MISOPROSTOL 200 UG/1
800 TABLET ORAL
Status: DISCONTINUED | OUTPATIENT
Start: 2024-07-23 | End: 2024-07-25 | Stop reason: HOSPADM

## 2024-07-23 RX ORDER — MISOPROSTOL 200 UG/1
400 TABLET ORAL
Status: DISCONTINUED | OUTPATIENT
Start: 2024-07-23 | End: 2024-07-25 | Stop reason: HOSPADM

## 2024-07-23 RX ORDER — PROCHLORPERAZINE 25 MG
25 SUPPOSITORY, RECTAL RECTAL EVERY 12 HOURS PRN
Status: DISCONTINUED | OUTPATIENT
Start: 2024-07-23 | End: 2024-07-25 | Stop reason: HOSPADM

## 2024-07-23 RX ORDER — LOPERAMIDE HCL 2 MG
4 CAPSULE ORAL
Status: DISCONTINUED | OUTPATIENT
Start: 2024-07-23 | End: 2024-07-25 | Stop reason: HOSPADM

## 2024-07-23 RX ORDER — METOCLOPRAMIDE HYDROCHLORIDE 5 MG/ML
10 INJECTION INTRAMUSCULAR; INTRAVENOUS EVERY 6 HOURS PRN
Status: DISCONTINUED | OUTPATIENT
Start: 2024-07-23 | End: 2024-07-25 | Stop reason: HOSPADM

## 2024-07-23 RX ORDER — CARBOPROST TROMETHAMINE 250 UG/ML
250 INJECTION, SOLUTION INTRAMUSCULAR
Status: DISCONTINUED | OUTPATIENT
Start: 2024-07-23 | End: 2024-07-25 | Stop reason: HOSPADM

## 2024-07-23 RX ORDER — LOPERAMIDE HCL 2 MG
2 CAPSULE ORAL
Status: DISCONTINUED | OUTPATIENT
Start: 2024-07-23 | End: 2024-07-25 | Stop reason: HOSPADM

## 2024-07-23 RX ORDER — NALOXONE HYDROCHLORIDE 0.4 MG/ML
0.2 INJECTION, SOLUTION INTRAMUSCULAR; INTRAVENOUS; SUBCUTANEOUS
Status: DISCONTINUED | OUTPATIENT
Start: 2024-07-23 | End: 2024-07-25 | Stop reason: HOSPADM

## 2024-07-23 RX ORDER — MAGNESIUM SULFATE 4 G/50ML
4 INJECTION INTRAVENOUS ONCE
Status: COMPLETED | OUTPATIENT
Start: 2024-07-23 | End: 2024-07-23

## 2024-07-23 RX ORDER — LABETALOL HYDROCHLORIDE 5 MG/ML
20-80 INJECTION, SOLUTION INTRAVENOUS EVERY 10 MIN PRN
Status: DISCONTINUED | OUTPATIENT
Start: 2024-07-23 | End: 2024-07-28 | Stop reason: HOSPADM

## 2024-07-23 RX ORDER — MAGNESIUM SULFATE HEPTAHYDRATE 40 MG/ML
2 INJECTION, SOLUTION INTRAVENOUS
Status: DISCONTINUED | OUTPATIENT
Start: 2024-07-23 | End: 2024-07-28 | Stop reason: HOSPADM

## 2024-07-23 RX ORDER — TRANEXAMIC ACID 10 MG/ML
1 INJECTION, SOLUTION INTRAVENOUS EVERY 30 MIN PRN
Status: DISCONTINUED | OUTPATIENT
Start: 2024-07-23 | End: 2024-07-25 | Stop reason: HOSPADM

## 2024-07-23 RX ORDER — METHYLERGONOVINE MALEATE 0.2 MG/ML
200 INJECTION INTRAVENOUS
Status: DISCONTINUED | OUTPATIENT
Start: 2024-07-23 | End: 2024-07-25 | Stop reason: HOSPADM

## 2024-07-23 RX ORDER — METOCLOPRAMIDE 10 MG/1
10 TABLET ORAL EVERY 6 HOURS PRN
Status: DISCONTINUED | OUTPATIENT
Start: 2024-07-23 | End: 2024-07-25 | Stop reason: HOSPADM

## 2024-07-23 RX ORDER — OXYTOCIN 10 [USP'U]/ML
10 INJECTION, SOLUTION INTRAMUSCULAR; INTRAVENOUS
Status: DISCONTINUED | OUTPATIENT
Start: 2024-07-23 | End: 2024-07-25 | Stop reason: HOSPADM

## 2024-07-23 RX ORDER — MORPHINE SULFATE 10 MG/ML
10 INJECTION, SOLUTION INTRAMUSCULAR; INTRAVENOUS
Status: COMPLETED | OUTPATIENT
Start: 2024-07-23 | End: 2024-07-24

## 2024-07-23 RX ORDER — HYDRALAZINE HYDROCHLORIDE 20 MG/ML
10 INJECTION INTRAMUSCULAR; INTRAVENOUS
Status: DISCONTINUED | OUTPATIENT
Start: 2024-07-23 | End: 2024-07-28 | Stop reason: HOSPADM

## 2024-07-23 RX ORDER — LIDOCAINE 40 MG/G
CREAM TOPICAL
Status: DISCONTINUED | OUTPATIENT
Start: 2024-07-23 | End: 2024-07-23 | Stop reason: HOSPADM

## 2024-07-23 RX ORDER — KETOROLAC TROMETHAMINE 30 MG/ML
30 INJECTION, SOLUTION INTRAMUSCULAR; INTRAVENOUS
Status: DISCONTINUED | OUTPATIENT
Start: 2024-07-23 | End: 2024-07-28 | Stop reason: HOSPADM

## 2024-07-23 RX ORDER — HYDROXYZINE HYDROCHLORIDE 50 MG/1
50 TABLET, FILM COATED ORAL
Status: COMPLETED | OUTPATIENT
Start: 2024-07-23 | End: 2024-07-24

## 2024-07-23 RX ORDER — MAGNESIUM SULFATE IN WATER 40 MG/ML
2 INJECTION, SOLUTION INTRAVENOUS CONTINUOUS
Status: DISPENSED | OUTPATIENT
Start: 2024-07-23 | End: 2024-07-26

## 2024-07-23 RX ORDER — ONDANSETRON 4 MG/1
4 TABLET, ORALLY DISINTEGRATING ORAL EVERY 6 HOURS PRN
Status: DISCONTINUED | OUTPATIENT
Start: 2024-07-23 | End: 2024-07-25 | Stop reason: HOSPADM

## 2024-07-23 RX ORDER — OXYTOCIN/0.9 % SODIUM CHLORIDE 30/500 ML
100-340 PLASTIC BAG, INJECTION (ML) INTRAVENOUS CONTINUOUS PRN
Status: DISCONTINUED | OUTPATIENT
Start: 2024-07-23 | End: 2024-07-28 | Stop reason: HOSPADM

## 2024-07-23 RX ORDER — PROCHLORPERAZINE MALEATE 10 MG
10 TABLET ORAL EVERY 6 HOURS PRN
Status: DISCONTINUED | OUTPATIENT
Start: 2024-07-23 | End: 2024-07-25 | Stop reason: HOSPADM

## 2024-07-23 RX ORDER — CALCIUM GLUCONATE 94 MG/ML
1 INJECTION, SOLUTION INTRAVENOUS
Status: DISCONTINUED | OUTPATIENT
Start: 2024-07-23 | End: 2024-07-28 | Stop reason: HOSPADM

## 2024-07-23 RX ADMIN — LABETALOL HYDROCHLORIDE 100 MG: 100 TABLET, FILM COATED ORAL at 21:07

## 2024-07-23 RX ADMIN — MAGNESIUM SULFATE HEPTAHYDRATE 2 G/HR: 40 INJECTION, SOLUTION INTRAVENOUS at 18:01

## 2024-07-23 RX ADMIN — SODIUM CHLORIDE, POTASSIUM CHLORIDE, SODIUM LACTATE AND CALCIUM CHLORIDE 75 ML/HR: 600; 310; 30; 20 INJECTION, SOLUTION INTRAVENOUS at 17:13

## 2024-07-23 RX ADMIN — LABETALOL HYDROCHLORIDE 40 MG: 5 INJECTION, SOLUTION INTRAVENOUS at 18:17

## 2024-07-23 RX ADMIN — MAGNESIUM SULFATE HEPTAHYDRATE 4 G: 80 INJECTION, SOLUTION INTRAVENOUS at 17:34

## 2024-07-23 RX ADMIN — LABETALOL HYDROCHLORIDE 20 MG: 5 INJECTION, SOLUTION INTRAVENOUS at 17:17

## 2024-07-23 ASSESSMENT — ACTIVITIES OF DAILY LIVING (ADL)
ADLS_ACUITY_SCORE: 20

## 2024-07-23 NOTE — PLAN OF CARE
Goal Outcome Evaluation:     BABY PCP AFTER DISCHARGE IS [facility] @1528 7/23/2024. EM N [facility] 7/23/2024 1536.

## 2024-07-23 NOTE — PROGRESS NOTES
"Data: Patient presented to [facility]: 2024  3:28 PM.  She was sent over from clinic by Dr Issa for labs and serial bp's. Reason for maternal/fetal assessment is elevated blood pressures. Patient reports feeling mild contractions for last few days. Patient denies leaking of vaginal fluid/rupture of membranes, vaginal bleeding, abdominal pain, pelvic pressure, nausea, headache, visual disturbances, epigastric or RUQ pain, significant edema. Patient reports fetal movement is normal. Patient is a 40w2d .  Prenatal record reviewed. Pregnancy has been complicated by hypertension.    Vital signs  see chart . Support person is present.     Action: Verbal consent for EFM. Triage assessment completed.     Response: Patient verbalized agreement with plan. Will contact Dr Issa with results of lab work and BP\"s.         "

## 2024-07-23 NOTE — PROGRESS NOTES
Spoke with Dr Issa and notified her of BP's.  Order rec'vd to place saline lock and repeat BP.  She will call back with further orders.

## 2024-07-24 LAB — T PALLIDUM AB SER QL: NONREACTIVE

## 2024-07-24 PROCEDURE — 10907ZC DRAINAGE OF AMNIOTIC FLUID, THERAPEUTIC FROM PRODUCTS OF CONCEPTION, VIA NATURAL OR ARTIFICIAL OPENING: ICD-10-PCS | Performed by: OBSTETRICS & GYNECOLOGY

## 2024-07-24 PROCEDURE — 250N000011 HC RX IP 250 OP 636: Performed by: OBSTETRICS & GYNECOLOGY

## 2024-07-24 PROCEDURE — 250N000009 HC RX 250: Performed by: OBSTETRICS & GYNECOLOGY

## 2024-07-24 PROCEDURE — 258N000003 HC RX IP 258 OP 636: Performed by: OBSTETRICS & GYNECOLOGY

## 2024-07-24 PROCEDURE — 120N000001 HC R&B MED SURG/OB

## 2024-07-24 PROCEDURE — 3E033VJ INTRODUCTION OF OTHER HORMONE INTO PERIPHERAL VEIN, PERCUTANEOUS APPROACH: ICD-10-PCS | Performed by: OBSTETRICS & GYNECOLOGY

## 2024-07-24 PROCEDURE — 250N000013 HC RX MED GY IP 250 OP 250 PS 637: Performed by: OBSTETRICS & GYNECOLOGY

## 2024-07-24 RX ORDER — OXYTOCIN/0.9 % SODIUM CHLORIDE 30/500 ML
1-12 PLASTIC BAG, INJECTION (ML) INTRAVENOUS CONTINUOUS
Status: DISCONTINUED | OUTPATIENT
Start: 2024-07-25 | End: 2024-07-25 | Stop reason: HOSPADM

## 2024-07-24 RX ORDER — HYDROXYZINE HYDROCHLORIDE 50 MG/1
TABLET, FILM COATED ORAL
Status: COMPLETED
Start: 2024-07-24 | End: 2024-07-24

## 2024-07-24 RX ORDER — SODIUM CHLORIDE, SODIUM LACTATE, POTASSIUM CHLORIDE, CALCIUM CHLORIDE 600; 310; 30; 20 MG/100ML; MG/100ML; MG/100ML; MG/100ML
INJECTION, SOLUTION INTRAVENOUS CONTINUOUS PRN
Status: DISCONTINUED | OUTPATIENT
Start: 2024-07-24 | End: 2024-07-25 | Stop reason: HOSPADM

## 2024-07-24 RX ORDER — TERBUTALINE SULFATE 1 MG/ML
0.25 INJECTION, SOLUTION SUBCUTANEOUS
Status: DISCONTINUED | OUTPATIENT
Start: 2024-07-24 | End: 2024-07-25 | Stop reason: HOSPADM

## 2024-07-24 RX ORDER — LIDOCAINE 40 MG/G
CREAM TOPICAL
Status: DISCONTINUED | OUTPATIENT
Start: 2024-07-24 | End: 2024-07-25 | Stop reason: HOSPADM

## 2024-07-24 RX ORDER — OXYTOCIN/0.9 % SODIUM CHLORIDE 30/500 ML
12-18 PLASTIC BAG, INJECTION (ML) INTRAVENOUS CONTINUOUS
Status: DISCONTINUED | OUTPATIENT
Start: 2024-07-24 | End: 2024-07-24

## 2024-07-24 RX ORDER — ACETAMINOPHEN 325 MG/1
975 TABLET ORAL EVERY 8 HOURS PRN
Status: DISCONTINUED | OUTPATIENT
Start: 2024-07-24 | End: 2024-07-28 | Stop reason: HOSPADM

## 2024-07-24 RX ADMIN — ACETAMINOPHEN 975 MG: 325 TABLET ORAL at 19:52

## 2024-07-24 RX ADMIN — SODIUM CHLORIDE, POTASSIUM CHLORIDE, SODIUM LACTATE AND CALCIUM CHLORIDE 75 ML/HR: 600; 310; 30; 20 INJECTION, SOLUTION INTRAVENOUS at 05:58

## 2024-07-24 RX ADMIN — LABETALOL HYDROCHLORIDE 100 MG: 100 TABLET, FILM COATED ORAL at 21:17

## 2024-07-24 RX ADMIN — LABETALOL HYDROCHLORIDE 100 MG: 100 TABLET, FILM COATED ORAL at 09:07

## 2024-07-24 RX ADMIN — HYDROXYZINE HYDROCHLORIDE 50 MG: 50 TABLET, FILM COATED ORAL at 00:08

## 2024-07-24 RX ADMIN — SODIUM CHLORIDE, POTASSIUM CHLORIDE, SODIUM LACTATE AND CALCIUM CHLORIDE: 600; 310; 30; 20 INJECTION, SOLUTION INTRAVENOUS at 19:27

## 2024-07-24 RX ADMIN — Medication 1 MILLI-UNITS/MIN: at 11:10

## 2024-07-24 RX ADMIN — MAGNESIUM SULFATE HEPTAHYDRATE 2 G/HR: 40 INJECTION, SOLUTION INTRAVENOUS at 21:37

## 2024-07-24 RX ADMIN — HYDROXYZINE HYDROCHLORIDE 50 MG: 50 TABLET, FILM COATED ORAL at 01:06

## 2024-07-24 ASSESSMENT — ACTIVITIES OF DAILY LIVING (ADL)
ADLS_ACUITY_SCORE: 20

## 2024-07-24 NOTE — PROGRESS NOTES
0820 Dr. Issa at bedside to assess patient and discuss plan of care with patient and SO.  Agreed to AROM to augment contractions.  0825 SVE per MD 5/70%/-1, completed AROM at that time, for moderate amount of clear fluid.

## 2024-07-24 NOTE — PLAN OF CARE
Goal Outcome Evaluation:      Plan of Care Reviewed With: patient, spouse    Overall Patient Progress: improvingOverall Patient Progress: improving    Outcome Evaluation: Patient becoming more uncomfortable while on pitocin with contractions, RN to continue to increase pitocin as able to.

## 2024-07-24 NOTE — PLAN OF CARE
"Care Plan Progress Note      Name: Stacy Avilez  : 2000  MRN: [id number]    VS: /59   Pulse 86   Temp 98.2  F (36.8  C) (Oral)   Resp 16   Ht 1.727 m (5' 8\")   Wt 95.9 kg (211 lb 6 oz)   SpO2 98%   BMI 32.14 kg/m      VSS, BPS normotensive.  IV fluids and mag running, cook in until 0645.  Cat 1 EFM.      Marce Upton RN  2024  5:23 AM      "

## 2024-07-24 NOTE — PROGRESS NOTES
1030 RN spoke with Dr. Issa - relayed to MD that patient now arjun every 1-4 minutes palpating mild to occasional moderate, soft in between.  Patient reporting contractions are a little more uncomfortable, but patient able to talk through contractions and is not having to focus on her breathing at this time.  FHR tracing cat 1 at this time.  Per MD - if patient agreeable, RN may start low dose pitocin at this time.

## 2024-07-24 NOTE — CONSULTS
SPIRITUAL HEALTH SERVICES CONSULT NOTE  [facility]; [facility]    Saw pt Stacy Avilez per Spiritual Care Consult (Due to admission screening response)    Patient/Family Understanding of Illness and Goals of Care - Met with Stacy and Dirk while Stacy was eating. She says that contractions are growing more intense. This is Stacy's second delivery. She denies any concerns at this times.     Distress and Loss - Not discussed    Strengths, Coping, and Resources - Stacy and Will identify good support around them.     Meaning, Beliefs, and Spirituality - Stacy and Dirk are Y[employer]. They have already received communion. They will notify their [employer] after the birth. No other needs identified at this time.     Plan of Care: Spiritual care staff will remain available for further follow-up as requested by patient, family or staff.      Farideh Tavarez M.Div.      Office: [phone] (for non-urgent requests)  Please Vocera or page through [facility] for time-sensitive requests

## 2024-07-24 NOTE — PROGRESS NOTES
Labor Progress Note    S: Patient feeling contractions a little more. Discussed AROM vs. Low dose pitocin as next step for MIL, and she desires AROM. GBS neg.    O: Fetal head well applied; AROM with amnihook at 0815 with clear fluid returned  FHR: 150bpm, + acels, no decels, moderate variability   [place]: q4-6min    A/P:   cHTN with superimposed pre-eclampsia with severe features  - cHTN: since teenager, declined induction at 39 weeks for cHTN, elevated Bps at OB appt 7/23              - severe by BPs              - asymptomatic              - BPs 110s-140s/50s-90s overnight              - acute anti-hypertensives                          - labetalol IV 20mg, 40mg (last 1817 7/23)              - maintenance anti-hypertensives                          -labetalol 100mg BID              - pre-e labs normal                          - repeat postpartum or prior to epidural placement with coags              - UOP adequate              - magnesium sulfate until 24 hours postpartum              - monitor Bps, I/Os, and symptoms closely              - MIL: AROM as above, start pitocin at 1030 if contractions not regular and painful, s/p cervical ripening balloon  2. Fetal Status  - fetal status reassuring   - continuous monitoring  - SVE confirmed cephalic presentation  - EFW see #3  3. Marginal Cord Insertion   - US 4/29/24: EFW 46%, AC 47%  - US 6/4/24: EFW 27%, AC 25.5%  - US 7/2/24: EFW 28.1%, AC 24.9%  4. Prenatal labs              - GBS neg              - Blood Type: O+  - Antibody screen: negative              - RPR NR, HepBsAg NR, Rubella immune, HIV NR, HepCAb NR              - failed 1h GTT, refused 3h GTT, CGM for 1 week showed normal BG    rTi Issa MD

## 2024-07-24 NOTE — PROGRESS NOTES
0715 Report received from Marce US RN.  Cares assumed at that time.  Bedside safety check performed.

## 2024-07-24 NOTE — H&P
"24   Stacy Avilez   2000    History&Physical     HPI: 24 year old  at 40w2d presents with elevated BP with a history of cHTN; she declined induction at 39 weeks. Feeling fetal movement. Denies leaking of fluid. Denies vaginal bleeding. Denies headache, vision changes, trouble breathing, chest pain, or RUQ pain.      OB History: reviewed   OB History    Para Term  AB Living   2 0 0 0 1 0   SAB IAB Ectopic Multiple Live Births   1 0 0 0 0      # Outcome Date GA Lbr Tolu/2nd Weight Sex Type Anes PTL Lv   2 Current            1 SAB 2022               GYN History: denies history of STDs or pelvic infections; denies history of abnormal PAP smears    Medical History: reviewed  Past Medical History:   Diagnosis Date    Anxiety     Bordetella pertussis 2009    childhood Asthma     Chronic hypertension - since teen years     Depressive disorder     Fracture of posterior malleolus 2013    Kidney stone x2      Surgical History: reviewed  History reviewed. No pertinent surgical history.    Family History: denies history of bleeding or clotting disorders     Social History:  denies use of tobacco, drugs, or alcohol    Medications:   Labetalol 100mg BID    Allergies: reviewed  Allergies   Allergen Reactions    Amoxicillin-Pot Clavulanate Nausea and Vomiting     Throws up       Vital signs:  Temp: 99.1  F (37.3  C) Temp src: Axillary BP: (!) 142/81 Pulse: 102   Resp: 16 SpO2: 98 % O2 Device: None (Room air)   Height: 172.7 cm (5' 8\") Weight: 96.1 kg (211 lb 14.4 oz)  Estimated body mass index is 32.22 kg/m  as calculated from the following:    Height as of this encounter: 1.727 m (5' 8\").    Weight as of this encounter: 96.1 kg (211 lb 14.4 oz).    Physical Exam:  General:  no distress  Abdomen: gravid, nontender  Extremities: no calf pain, 1+ symmetric edema in legs  SVE: 1.5/50/-3; cephalic presentation  Discussed risks and benefits of cervical ripening balloon, and patient is in " agreement with having it placed. Cervical ripening catheter placed and uterine balloon placed through internal cervical os and filled with 50cc while assuring it remained proximal to the internal cervical os. Position checked with tension and correct placement was confirmed. Vaginal balloon then filled with 50cc and correct position confirmed. Additional 30cc placed in both uterine and vaginal balloons for a total of 80cc in each balloon. Cervical ripening balloon can stay in until 0615, however discussed with the patient that it could be removed sooner if needed. If SROM occurs, then very slow removal of fluid from uterine balloon would be appropriate to decrease the risk of cord prolapse around the fetal head in the space that the balloon creates (with slow removal, would allow this space to decrease slowly over time).      FHRT: 135bpm, + accelerations, no decelarations, mod variability  TOCO: q2-4min    Labs: all recent labs reviewed    Assessment/Plan: 24 year old  at 40w3d  cHTN with superimposed pre-eclampsia with severe features  - cHTN: since teenager, declined induction at 39 weeks for cHTN, elevated Bps at OB appt today   - severe by BPs              - asymptomatic              - BPs 140s-170s/80s-90s on admission   - acute anti-hypertensives    - labetalol IV 20mg, 40mg (last )              - maintenance anti-hypertensives                          -labetalol 100mg BID              - pre-e labs normal    - repeat postpartum or prior to epidural placement with coags              - UOP adequate              - magnesium sulfate until 24 hours postpartum   - monitor Bps, I/Os, and symptoms closely   - MIL: cervical ripening balloon as above until 0615, evaluate after removal for AROM vs. pitocin  2. Fetal Status  - fetal status reassuring   - continuous monitoring  - SVE confirmed cephalic presentation  - EFW see #3  3. Marginal Cord Insertion   - US 24: EFW 46%, AC 47%  - US 24: EFW  27%, AC 25.5%  - US 7/2/24: EFW 28.1%, AC 24.9%  4. Prenatal labs   - GBS neg   - Blood Type: O+  - Antibody screen: negative   - RPR NR, HepBsAg NR, Rubella immune, HIV NR, HepCAb NR   - failed 1h GTT, refused 3h GTT, CGM for 1 week showed normal BG    Discussed plan of care with patient and , and the patient expressed understanding. Opportunity for questions given, and all questions were answered.    Tri Issa MD

## 2024-07-24 NOTE — PROGRESS NOTES
1830 RN updated Dr. Issa - relayed to MD that SVE at 1810 5/85%/0.  Patient occasionally breathing through the beginning of the contractions.  RN just turned pitocin up to 12 mu at 1826.  MD going to adjust pitocin order at this time.

## 2024-07-25 ENCOUNTER — ANESTHESIA (OUTPATIENT)
Dept: OBGYN | Facility: HOSPITAL | Age: 24
End: 2024-07-25
Payer: COMMERCIAL

## 2024-07-25 ENCOUNTER — ANESTHESIA EVENT (OUTPATIENT)
Dept: OBGYN | Facility: HOSPITAL | Age: 24
End: 2024-07-25
Payer: COMMERCIAL

## 2024-07-25 LAB
APTT PPP: 30 SECONDS (ref 22–38)
HOLD SPECIMEN: NORMAL
INR PPP: 1.04 (ref 0.85–1.15)
PLATELET # BLD AUTO: 369 10E3/UL (ref 150–450)

## 2024-07-25 PROCEDURE — 250N000011 HC RX IP 250 OP 636: Performed by: ANESTHESIOLOGY

## 2024-07-25 PROCEDURE — 85730 THROMBOPLASTIN TIME PARTIAL: CPT | Performed by: STUDENT IN AN ORGANIZED HEALTH CARE EDUCATION/TRAINING PROGRAM

## 2024-07-25 PROCEDURE — 120N000001 HC R&B MED SURG/OB

## 2024-07-25 PROCEDURE — 85610 PROTHROMBIN TIME: CPT | Performed by: STUDENT IN AN ORGANIZED HEALTH CARE EDUCATION/TRAINING PROGRAM

## 2024-07-25 PROCEDURE — 59400 OBSTETRICAL CARE: CPT | Performed by: ANESTHESIOLOGY

## 2024-07-25 PROCEDURE — 370N000003 HC ANESTHESIA WARD SERVICE: Performed by: ANESTHESIOLOGY

## 2024-07-25 PROCEDURE — 3E0R3BZ INTRODUCTION OF ANESTHETIC AGENT INTO SPINAL CANAL, PERCUTANEOUS APPROACH: ICD-10-PCS | Performed by: ANESTHESIOLOGY

## 2024-07-25 PROCEDURE — 36415 COLL VENOUS BLD VENIPUNCTURE: CPT | Performed by: STUDENT IN AN ORGANIZED HEALTH CARE EDUCATION/TRAINING PROGRAM

## 2024-07-25 PROCEDURE — 258N000003 HC RX IP 258 OP 636: Performed by: OBSTETRICS & GYNECOLOGY

## 2024-07-25 PROCEDURE — 85049 AUTOMATED PLATELET COUNT: CPT | Performed by: STUDENT IN AN ORGANIZED HEALTH CARE EDUCATION/TRAINING PROGRAM

## 2024-07-25 PROCEDURE — 722N000001 HC LABOR CARE VAGINAL DELIVERY SINGLE

## 2024-07-25 PROCEDURE — 250N000009 HC RX 250: Performed by: OBSTETRICS & GYNECOLOGY

## 2024-07-25 PROCEDURE — 0DQR0ZZ REPAIR ANAL SPHINCTER, OPEN APPROACH: ICD-10-PCS | Performed by: OBSTETRICS & GYNECOLOGY

## 2024-07-25 PROCEDURE — 250N000013 HC RX MED GY IP 250 OP 250 PS 637: Performed by: OBSTETRICS & GYNECOLOGY

## 2024-07-25 PROCEDURE — 250N000011 HC RX IP 250 OP 636: Performed by: STUDENT IN AN ORGANIZED HEALTH CARE EDUCATION/TRAINING PROGRAM

## 2024-07-25 PROCEDURE — 00HU33Z INSERTION OF INFUSION DEVICE INTO SPINAL CANAL, PERCUTANEOUS APPROACH: ICD-10-PCS | Performed by: ANESTHESIOLOGY

## 2024-07-25 PROCEDURE — 250N000011 HC RX IP 250 OP 636: Performed by: OBSTETRICS & GYNECOLOGY

## 2024-07-25 RX ORDER — FENTANYL/ROPIVACAINE/NS/PF 2MCG/ML-.1
PLASTIC BAG, INJECTION (ML) EPIDURAL
Status: DISCONTINUED | OUTPATIENT
Start: 2024-07-25 | End: 2024-07-25 | Stop reason: HOSPADM

## 2024-07-25 RX ORDER — ACETAMINOPHEN 325 MG/1
650 TABLET ORAL EVERY 4 HOURS PRN
Status: DISCONTINUED | OUTPATIENT
Start: 2024-07-25 | End: 2024-07-28 | Stop reason: HOSPADM

## 2024-07-25 RX ORDER — CARBOPROST TROMETHAMINE 250 UG/ML
250 INJECTION, SOLUTION INTRAMUSCULAR
Status: DISCONTINUED | OUTPATIENT
Start: 2024-07-25 | End: 2024-07-28 | Stop reason: HOSPADM

## 2024-07-25 RX ORDER — LOPERAMIDE HCL 2 MG
2 CAPSULE ORAL
Status: DISCONTINUED | OUTPATIENT
Start: 2024-07-25 | End: 2024-07-28 | Stop reason: HOSPADM

## 2024-07-25 RX ORDER — MODIFIED LANOLIN
OINTMENT (GRAM) TOPICAL
Status: DISCONTINUED | OUTPATIENT
Start: 2024-07-25 | End: 2024-07-28 | Stop reason: HOSPADM

## 2024-07-25 RX ORDER — DOCUSATE SODIUM 100 MG/1
100 CAPSULE, LIQUID FILLED ORAL 2 TIMES DAILY
Status: DISCONTINUED | OUTPATIENT
Start: 2024-07-25 | End: 2024-07-28 | Stop reason: HOSPADM

## 2024-07-25 RX ORDER — METHYLERGONOVINE MALEATE 0.2 MG/ML
200 INJECTION INTRAVENOUS
Status: DISCONTINUED | OUTPATIENT
Start: 2024-07-25 | End: 2024-07-28 | Stop reason: HOSPADM

## 2024-07-25 RX ORDER — LOPERAMIDE HCL 2 MG
4 CAPSULE ORAL
Status: DISCONTINUED | OUTPATIENT
Start: 2024-07-25 | End: 2024-07-28 | Stop reason: HOSPADM

## 2024-07-25 RX ORDER — IBUPROFEN 800 MG/1
800 TABLET, FILM COATED ORAL EVERY 6 HOURS PRN
Status: DISCONTINUED | OUTPATIENT
Start: 2024-07-25 | End: 2024-07-28 | Stop reason: HOSPADM

## 2024-07-25 RX ORDER — TRANEXAMIC ACID 10 MG/ML
1 INJECTION, SOLUTION INTRAVENOUS EVERY 30 MIN PRN
Status: DISCONTINUED | OUTPATIENT
Start: 2024-07-25 | End: 2024-07-28 | Stop reason: HOSPADM

## 2024-07-25 RX ORDER — FENTANYL CITRATE 50 UG/ML
100 INJECTION, SOLUTION INTRAMUSCULAR; INTRAVENOUS ONCE
Status: COMPLETED | OUTPATIENT
Start: 2024-07-25 | End: 2024-07-25

## 2024-07-25 RX ORDER — FENTANYL CITRATE-0.9 % NACL/PF 10 MCG/ML
100 PLASTIC BAG, INJECTION (ML) INTRAVENOUS EVERY 5 MIN PRN
Status: DISCONTINUED | OUTPATIENT
Start: 2024-07-25 | End: 2024-07-25 | Stop reason: HOSPADM

## 2024-07-25 RX ORDER — HYDROCORTISONE 25 MG/G
CREAM TOPICAL 3 TIMES DAILY PRN
Status: DISCONTINUED | OUTPATIENT
Start: 2024-07-25 | End: 2024-07-28 | Stop reason: HOSPADM

## 2024-07-25 RX ORDER — MISOPROSTOL 200 UG/1
800 TABLET ORAL
Status: DISCONTINUED | OUTPATIENT
Start: 2024-07-25 | End: 2024-07-28 | Stop reason: HOSPADM

## 2024-07-25 RX ORDER — OXYTOCIN/0.9 % SODIUM CHLORIDE 30/500 ML
340 PLASTIC BAG, INJECTION (ML) INTRAVENOUS CONTINUOUS PRN
Status: DISCONTINUED | OUTPATIENT
Start: 2024-07-25 | End: 2024-07-28 | Stop reason: HOSPADM

## 2024-07-25 RX ORDER — NALBUPHINE HYDROCHLORIDE 20 MG/ML
2.5-5 INJECTION, SOLUTION INTRAMUSCULAR; INTRAVENOUS; SUBCUTANEOUS EVERY 6 HOURS PRN
Status: DISCONTINUED | OUTPATIENT
Start: 2024-07-25 | End: 2024-07-28 | Stop reason: HOSPADM

## 2024-07-25 RX ORDER — BUPIVACAINE HYDROCHLORIDE 2.5 MG/ML
INJECTION, SOLUTION EPIDURAL; INFILTRATION; INTRACAUDAL
Status: COMPLETED | OUTPATIENT
Start: 2024-07-25 | End: 2024-07-25

## 2024-07-25 RX ORDER — OXYTOCIN 10 [USP'U]/ML
10 INJECTION, SOLUTION INTRAMUSCULAR; INTRAVENOUS
Status: DISCONTINUED | OUTPATIENT
Start: 2024-07-25 | End: 2024-07-28 | Stop reason: HOSPADM

## 2024-07-25 RX ORDER — MISOPROSTOL 200 UG/1
400 TABLET ORAL
Status: DISCONTINUED | OUTPATIENT
Start: 2024-07-25 | End: 2024-07-28 | Stop reason: HOSPADM

## 2024-07-25 RX ADMIN — ACETAMINOPHEN 975 MG: 325 TABLET ORAL at 22:51

## 2024-07-25 RX ADMIN — WITCH HAZEL: 500 SOLUTION RECTAL; TOPICAL at 22:51

## 2024-07-25 RX ADMIN — Medication: at 17:02

## 2024-07-25 RX ADMIN — BUPIVACAINE HYDROCHLORIDE 5 ML: 2.5 INJECTION, SOLUTION EPIDURAL; INFILTRATION; INTRACAUDAL at 17:19

## 2024-07-25 RX ADMIN — LABETALOL HYDROCHLORIDE 100 MG: 100 TABLET, FILM COATED ORAL at 20:59

## 2024-07-25 RX ADMIN — SODIUM CHLORIDE, POTASSIUM CHLORIDE, SODIUM LACTATE AND CALCIUM CHLORIDE 125 ML/HR: 600; 310; 30; 20 INJECTION, SOLUTION INTRAVENOUS at 18:40

## 2024-07-25 RX ADMIN — BENZOCAINE AND LEVOMENTHOL: 200; 5 SPRAY TOPICAL at 22:51

## 2024-07-25 RX ADMIN — Medication 100 ML/HR: at 21:33

## 2024-07-25 RX ADMIN — MISOPROSTOL 800 MCG: 200 TABLET ORAL at 19:57

## 2024-07-25 RX ADMIN — LABETALOL HYDROCHLORIDE 100 MG: 100 TABLET, FILM COATED ORAL at 09:14

## 2024-07-25 RX ADMIN — ACETAMINOPHEN 975 MG: 325 TABLET ORAL at 09:14

## 2024-07-25 RX ADMIN — MAGNESIUM SULFATE HEPTAHYDRATE 2 G/HR: 40 INJECTION, SOLUTION INTRAVENOUS at 18:56

## 2024-07-25 RX ADMIN — FENTANYL CITRATE 100 MCG: 50 INJECTION, SOLUTION INTRAMUSCULAR; INTRAVENOUS at 16:52

## 2024-07-25 RX ADMIN — LIDOCAINE HYDROCHLORIDE 20 ML: 10 INJECTION, SOLUTION EPIDURAL; INFILTRATION; INTRACAUDAL; PERINEURAL at 19:59

## 2024-07-25 ASSESSMENT — ACTIVITIES OF DAILY LIVING (ADL)
ADLS_ACUITY_SCORE: 20

## 2024-07-25 NOTE — ANESTHESIA PROCEDURE NOTES
"Epidural catheter Procedure Note    Pre-Procedure   Staff -        Anesthesiologist:  Sami Lantigua MD       Performed By: anesthesiologist       Location: OB       Procedure Start/Stop Times: 7/25/2024 5:03 PM and 7/25/2024 5:19 PM       Pre-Anesthestic Checklist: patient identified, IV checked, risks and benefits discussed, informed consent, monitors and equipment checked, pre-op evaluation, at physician/surgeon's request and post-op pain management  Timeout:       Correct Patient: Yes        Correct Procedure: Yes        Correct Site: Yes        Correct Position: Yes   Procedure Documentation  Procedure: epidural catheter       Diagnosis: Labor pain       Patient Position: sitting       Patient Prep/Sterile Barriers: sterile gloves, mask       Skin prep: Chloraprep       Local skin infiltrated with 2 mL of 1% lidocaine.        Insertion Site: L2-3. (midline approach).       Technique: LORT saline        DAAYN at 6 cm.       Needle Type: [employer]       Needle Gauge: 18.        Needle Length (Inches): 3.5        Catheter: 20 G.          Catheter threaded easily.         5 cm epidural space.         Threaded 11 cm at skin.         # of attempts: 1 and  # of redirects:  1    Assessment/Narrative         Paresthesias: No.       Test dose of 5 mL lidocaine 1.5% w/ 1:200,000 epinephrine at 17:12 CDT.         Test dose negative, 3 minutes after injection, for signs of intravascular, subdural, or intrathecal injection.       Insertion/Infusion Method: LORT saline       Aspiration negative for Heme or CSF via Epidural Catheter.    Medication(s) Administered   0.25% Bupivacaine PF (Epidural) - EPIDURAL   5 mL - 7/25/2024 5:19:00 PM  Medication Administration Time: 7/25/2024 5:03 PM      FOR [facility] ([facility]) ONLY:   Pain Team Contact information: please page the Pain Team Via [employer]. Search \"Pain\". During daytime hours, please page the attending first. At night please page the resident first.      "

## 2024-07-25 NOTE — PROVIDER NOTIFICATION
07/25/24 0629   Provider Notification   Provider Name/Title Dr. Issa   Method of Notification Phone   Request Evaluate - Remote   Notification Reason Status Update       Dr. Issa updated on pt's FHR tracing with recurrent subtle dips following contractions. Reported that patient was turned to the opposite side. Luis Manuel is not concerned for fetal metabolic acidemia due to presence of accelerations and moderate variability. Will continue to monitor.

## 2024-07-25 NOTE — PROGRESS NOTES
Labor Progress Note    S: Patient feeling minimal contractions    O: SVE 5/80/-3  FHR: 140bpm, + acels, intermittent late decels, moderate variability   [place]: q1-5min    A/P: Discussed options with the patient with minimal cervical change since this morning and current FHRT, and she would prefer a pitocin break and restarting pitocin after 4 hours. Discussed with RN. Orders placed.     Tri Issa MD

## 2024-07-25 NOTE — PLAN OF CARE
Goal Outcome Evaluation:      Plan of Care Reviewed With: patient, spouse    Overall Patient Progress: improving  Overall Patient Progress: improving     Patient afebrile and vitally stable. 1 elevated BP: 142/82, not in parameters to treat. Coping with contraction pain. Contractions mild by palpation, soft uterine resting tone. Pitocin restarted at 0211, increasing by 1 ml/hr to a current rate of 6 ml/hr. Patient reports she slept well overnight.

## 2024-07-25 NOTE — PROVIDER NOTIFICATION
Provider notification:  Provider Name: Dr Luis Manuel SINGH. Notified at 2051 regarding a persistent category II fetal heart rate tracing for 30 minutes.   Baseline rate 140, normal  Variability moderate  Accelerations present  Decelerations present, deceleration type: late , deceleration frequency: recurrent (occurring 50% or more).   Are the decelerations significant?   No      EFM interpretation suggests absence of concern for fetal metabolic acidemia at this time due to accelerations present, heart rate: normal baseline, and variability: moderate    Uterine Activity normal/regular.    Interventions to improve fetal oxygenation for a category II tracing include:maternal positioning, increase frequency of assessment, evaluate labor progress, blood pressure check, and emotional support    After discussion with provider:Plan reassessment in 45 minutes    Marce Upton RN  7/24/2024 2051 PM

## 2024-07-25 NOTE — ANESTHESIA PREPROCEDURE EVALUATION
Anesthesia Pre-Procedure Evaluation    Patient: Stacy Avliez   MRN: [id number] : 2000        Procedure :           Past Medical History:   Diagnosis Date     Anxiety      Bordetella pertussis 2009     childhood Asthma      Chronic hypertension - since teen years      Depressive disorder      Fracture of posterior malleolus 2013     Kidney stone x2       History reviewed. No pertinent surgical history.   Allergies   Allergen Reactions     Amoxicillin-Pot Clavulanate Nausea and Vomiting     Throws up      Social History     Tobacco Use     Smoking status: Never     Smokeless tobacco: Never   Substance Use Topics     Alcohol use: No      Wt Readings from Last 1 Encounters:   24 95.3 kg (210 lb)        Anesthesia Evaluation            ROS/MED HX  ENT/Pulmonary:     (+)                      asthma                  Neurologic:       Cardiovascular: Comment: Pre-E, labs pending    (+)  hypertension- -   -  - -                                      METS/Exercise Tolerance:     Hematologic:       Musculoskeletal:       GI/Hepatic:       Renal/Genitourinary:     (+) renal disease,             Endo:       Psychiatric/Substance Use:       Infectious Disease:       Malignancy:       Other:          Physical Exam    Airway        Mallampati: II   TM distance: > 3 FB   Neck ROM: full     Respiratory Devices and Support         Dental       (+) Minor Abnormalities - some fillings, tiny chips      Cardiovascular   cardiovascular exam normal          Pulmonary   pulmonary exam normal            OUTSIDE LABS:  CBC:   Lab Results   Component Value Date    WBC 12.9 (H) 2024    WBC 13.5 (H) 2024    HGB 11.7 2024    HGB 12.0 2024    HCT 36.0 2024    HCT 33.9 (L) 2024     2024     2024     BMP:   Lab Results   Component Value Date     2024     2024    POTASSIUM 4.0 2024    POTASSIUM 3.8 2024    CHLORIDE 104 2024  "   CHLORIDE 105 05/05/2024    CO2 22 07/23/2024    CO2 22 05/05/2024    BUN 10.7 07/23/2024    BUN 14.7 05/05/2024    CR 0.65 07/23/2024    CR 0.60 05/05/2024    GLC 90 07/23/2024    GLC 91 05/05/2024     COAGS: No results found for: \"PTT\", \"INR\", \"FIBR\"  POC:   Lab Results   Component Value Date    HCG Negative 01/09/2016     HEPATIC:   Lab Results   Component Value Date    ALBUMIN 3.7 07/23/2024    PROTTOTAL 6.7 07/23/2024    ALT 15 07/23/2024    AST 22 07/23/2024    ALKPHOS 152 (H) 07/23/2024    BILITOTAL <0.2 07/23/2024     OTHER:   Lab Results   Component Value Date    KWADWO 9.0 07/23/2024    TSH 1.65 07/26/2010    T4 0.90 07/26/2010       Anesthesia Plan    ASA Status:  3    NPO Status:  NPO Appropriate    Anesthesia Type: Epidural.              Consents    Anesthesia Plan(s) and associated risks, benefits, and realistic alternatives discussed. Questions answered and patient/representative(s) expressed understanding.     - Discussed: Risks, Benefits and Alternatives for BOTH SEDATION and the PROCEDURE were discussed     - Discussed with:  Patient            Postoperative Care            Comments:             Agapito Tavarez,     I have reviewed the pertinent notes and labs in the chart from the past 30 days and (re)examined the patient.  Any updates or changes from those notes are reflected in this note.             "

## 2024-07-25 NOTE — PROVIDER NOTIFICATION
Provider notification:  Provider Name: Luis Manuel TINSLEY Notified at 2121 regarding a persistent category II fetal heart rate tracing for 60 minutes.   Baseline rate Undetermined, normal  Variability marked      EFM interpretation suggests absence of concern for fetal metabolic acidemia at this time due to     Uterine Activity normal/regular.    Interventions to improve fetal oxygenation for a category II tracing include:maternal positioning, discontinue oxytocin , increase frequency of assessment, and evaluate labor progress    After discussion with provider: Pit break until 1:30am will reassess and restart pit at that time     Marce Upton RN  7/24/2024 9:31 PM

## 2024-07-25 NOTE — PROGRESS NOTES
Labor Progress Note    S: Patient feeling contractions more intensely than she ever has.    O: SVE 6/90/-1  FHR: 140bpm, + acels, early decels, moderate variability   [place]: q2-4min    A/P: continue low dose pitocin and position changes; discussed active labor stage with the patient and her     Tri Issa MD

## 2024-07-26 PROCEDURE — 250N000011 HC RX IP 250 OP 636: Performed by: OBSTETRICS & GYNECOLOGY

## 2024-07-26 PROCEDURE — 120N000001 HC R&B MED SURG/OB

## 2024-07-26 PROCEDURE — 250N000013 HC RX MED GY IP 250 OP 250 PS 637: Performed by: OBSTETRICS & GYNECOLOGY

## 2024-07-26 PROCEDURE — 250N000009 HC RX 250: Performed by: OBSTETRICS & GYNECOLOGY

## 2024-07-26 RX ORDER — LABETALOL 200 MG/1
200 TABLET, FILM COATED ORAL EVERY 8 HOURS SCHEDULED
Status: DISCONTINUED | OUTPATIENT
Start: 2024-07-26 | End: 2024-07-28

## 2024-07-26 RX ADMIN — IBUPROFEN 800 MG: 800 TABLET ORAL at 00:25

## 2024-07-26 RX ADMIN — DOCUSATE SODIUM 100 MG: 100 CAPSULE, LIQUID FILLED ORAL at 00:02

## 2024-07-26 RX ADMIN — MAGNESIUM SULFATE HEPTAHYDRATE 2 G/HR: 40 INJECTION, SOLUTION INTRAVENOUS at 14:58

## 2024-07-26 RX ADMIN — IBUPROFEN 800 MG: 800 TABLET ORAL at 09:03

## 2024-07-26 RX ADMIN — IBUPROFEN 800 MG: 800 TABLET ORAL at 21:25

## 2024-07-26 RX ADMIN — IBUPROFEN 800 MG: 800 TABLET ORAL at 15:24

## 2024-07-26 RX ADMIN — DOCUSATE SODIUM 100 MG: 100 CAPSULE, LIQUID FILLED ORAL at 09:03

## 2024-07-26 RX ADMIN — ACETAMINOPHEN 650 MG: 325 TABLET ORAL at 20:08

## 2024-07-26 RX ADMIN — MINERAL OIL: 1000 LIQUID ORAL at 21:27

## 2024-07-26 RX ADMIN — ACETAMINOPHEN 975 MG: 325 TABLET ORAL at 11:50

## 2024-07-26 RX ADMIN — DOCUSATE SODIUM 100 MG: 100 CAPSULE, LIQUID FILLED ORAL at 20:09

## 2024-07-26 RX ADMIN — LABETALOL HYDROCHLORIDE 200 MG: 200 TABLET ORAL at 14:59

## 2024-07-26 RX ADMIN — LABETALOL HYDROCHLORIDE 100 MG: 100 TABLET, FILM COATED ORAL at 09:04

## 2024-07-26 RX ADMIN — LABETALOL HYDROCHLORIDE 200 MG: 200 TABLET ORAL at 22:58

## 2024-07-26 ASSESSMENT — ACTIVITIES OF DAILY LIVING (ADL)
ADLS_ACUITY_SCORE: 20

## 2024-07-26 NOTE — PLAN OF CARE
Data: Vital signs within normal limits with exception of one elevated BP 1407/75 this morning. Patient denies PreE symptoms and she is looking forward to IV infusions ending for ease of arm movement. Postpartum checks within normal limits - see flow record. Patient eating and drinking normally. Adequate urine output. Patient able to empty bladder independently and is up ambulating. Patient performing self cares, is able to care for infant and is breastfeeding every 2-3 hours. Laceration painful, especially while ambulating, rating 5/10. Using ice packs and tucks for discomfort. Plan to do sitz bath this afternoon if patient steady.     Action: Patient medicated with ibuprofen during the shift for pain. See MAR. Adequate pain control noted by patient. Patient education done, see flow record.    Response: Positive attachment behaviors observed with infant. Patient's  and family present this shift.     Plan: Continue current plan of care.     Problem: Adult Inpatient Plan of Care  Goal: Absence of Hospital-Acquired Illness or Injury  Intervention: Prevent and Manage VTE (Venous Thromboembolism) Risk  Recent Flowsheet Documentation  Taken 7/26/2024 0758 by Iveth Guardado RN  VTE Prevention/Management: (Ambulating) other (see comments)     Problem: Adult Inpatient Plan of Care  Goal: Optimal Comfort and Wellbeing  Intervention: Monitor Pain and Promote Comfort  Recent Flowsheet Documentation  Taken 7/26/2024 0758 by Iveth Guardado RN  Pain Management Interventions:   medication (see MAR)   cold applied   pillow support provided   quiet environment facilitated   repositioned   rest

## 2024-07-26 NOTE — PROGRESS NOTES
Called MD Issa to notify of 2 b/ps over 140 since starting mag per postpartum orders. Tony Issa only treat severe range pressures and ok to monitor pressures q4 while pt is sleeping overnight.

## 2024-07-26 NOTE — PROGRESS NOTES
Data: Stacy Avilez transferred to /-37 via    .    .    Action: Receiving unit notified of transfer. Patient and support person notified of room change. Patient and belongings accompanied by Registered Nurse. Bedside report given to Alva Barbosa. Fundal check performed with receiving RN. Oriented patient to surroundings. Call light within reach.    Response: Patient tolerated transfer.    Marce Upton RN  7/25/2024 11:21 PM

## 2024-07-26 NOTE — L&D DELIVERY NOTE
24   Stacy Avilez   2000    Delivery Note    Procedure: ; repair of 3rd degree laceration    Physician: Tri Issa MD      The patient presented to labor and delivery at 40 weeks and 2 days for MIL for cHTN with superimposed pre-eclampsia with severe features.  Pregnancy complicated by marginal cord insertion.  Cervical ripening balloon was used for cervical ripening.  Induction of labor with low-dose oxytocin and amniotomy. She progressed to complete cervical dilation, and with maternal pushing, male infant was delivered vertex in ORA position at 1935 with Apgars of 8 and 8 and a weight of 3250g (7 pounds 3 ounces) at 40 weeks 4 days. No nuchal cord. After delayed cord clamping, the umbilical cord was double clamped and cut and the baby was placed on the mother's chest. NNP present as patient received fentanyl within 4 hours of delivery.    The placenta delivered spontaneously at 1943 and appeared complete and intact with a marginal cord insertion. The uterine fundus was massaged and found to be firm with minimal vaginal bleeding.      Third degree and left sulcal lacerations were present.  The lacerations were injected with 1% lidocaine until the patient no longer had sensation of these areas. The sulcal laceration was repaired with 3-0 Vicryl in a running locked stitch.     Partial third degree perineal (3a, less than 50% of the external anal sphincter was torn): rectal exam performed and vagina carefully evaluated with no rectal tears noted and 40% of EAS  with remainder intact; external anal sphincter repaired with 0 PDS figure of eight sutures circumferentially assuring to incorporate the capsule. Remainder of the laceration repaired in the usual fashion with 3-0 vicryl. Rectal exam performed at the completion of the repair with no suture felt in rectum and excellent reapproximation of the EAS.   The lacerations appeared hemostatic at the completion of the repair.    The uterine  fundus was again massaged and felt to be boggy. Rectal cytotec placed while assuring not to disrupt laceration repair. Uterine fundus firm with minimal vaginal bleeding. Total QBL 295cc.     Sponge and needle counts were correct, and the patient tolerated the delivery well overall with the assistance of epidural anesthesia and local anesthetic used in repairing the laceration.  Baby remained with the patient.    Tri Issa MD

## 2024-07-26 NOTE — PROGRESS NOTES
Pt unable to void after delivery. Pt bladder scanned for 500ml. Pt declines straight cath at this time, risks explained to pt. Pt would like to drink more water and try to go to the bathroom again soon.

## 2024-07-26 NOTE — PROGRESS NOTES
24   Stacy Avielz   2000    Postpartum Rounding Note    Subjective: Patient doing well. She is tolerating general diet, urinating without difficulty, and ambulating without lightheadedness. She is breastfeeding. Vaginal bleeding is within normal limits. Denies headache, vision changes, trouble breathing, chest pain, or RUQ pain.    Vital signs:  Temp: 98.1  F (36.7  C) Temp src: Oral BP: 129/64 Pulse: 95   Resp: 18 SpO2: 97 % O2 Device: None (Room air)        Physical Exam:  General:  no distress  Abdomen: nontender, uterine fundus firm below umbilicus  Extremities: no calf pain, minimal symmetric edema in legs    Labs: all recent labs reviewed      Assessment/Plan: 24 year old PPD#1 s/p   1. Postpartum    - afebrile, VSS   - continue postpartum cares   2. cHTN with superimposed pre-eclampsia with severe features  - cHTN: since teenager, declined induction at 39 weeks for cHTN, elevated Bps at OB appt               - severe by BPs              - asymptomatic              - BPs 110s-140s/50s-70s overnight              - acute anti-hypertensives                          - labetalol IV 20mg, 40mg (last )              - maintenance anti-hypertensives                          -labetalol 200mg q8h (increased  PM)  -hold if systolic BP <130              - pre-e labs normal               - UOP adequate              - magnesium sulfate until 24 hours postpartum (discontinue at 1935 on )              - monitor Bps, I/Os, and symptoms closely  3. Third degree laceration              - discussed extensively cares including keeping stools soft, sitz baths 2x per day, wet wipes, and donut pillow; also consider PT at 6 weeks PP    Discussed plan of care with patient and , and patient expressed understanding. Opportunity for questions given, and all questions were answered.    Disposition: plan for discharge at least 72 hours PP (night of )      Tri Issa MD

## 2024-07-26 NOTE — PLAN OF CARE
Problem: Adult Inpatient Plan of Care  Goal: Plan of Care Review  Description: The Plan of Care Review/Shift note should be completed every shift.  The Outcome Evaluation is a brief statement about your assessment that the patient is improving, declining, or no change.  This information will be displayed automatically on your shift  note.  Outcome: Progressing  Flowsheets (Taken 7/26/2024 0650)  Outcome Evaluation: VSS. Pt doing well. Pain rated at a 2 at this time.  Plan of Care Reviewed With:   patient   spouse  Overall Patient Progress: improving   Goal Outcome Evaluation:      Plan of Care Reviewed With: patient, spouse    Overall Patient Progress: improvingOverall Patient Progress: improving    Outcome Evaluation: VSS. Pt doing well. Pain rated at a 2 at this time.      Problem: Hypertensive Disorders in Pregnancy  Goal: Patient-Fetal Stabilization  Intervention: Optimize Blood Pressure and Fluid Status  Recent Flowsheet Documentation  Taken 7/26/2024 0400 by Alva Barbosa RN  Fluid/Electrolyte Management: fluids provided  Taken 7/26/2024 0000 by Alva Barbosa RN  Fluid/Electrolyte Management: fluids provided       Problem: Postpartum (Vaginal Delivery)  Goal: Optimal Pain Control and Function  7/26/2024 0648 by Alva Barbosa, RN  Outcome: Progressing    No b/ps in severe range this shift. Feeling well on magnesium. Pain is controlled with ice packs and PRN medications. Labial swelling treated with ice and mag pack ordered. Pt up to void x1 800 ml, one more measured void needed. Pt able to get some sleep overnight. Supportive spouse at bedside.

## 2024-07-26 NOTE — PROGRESS NOTES
Pt agreeable to straight cath after unable to void again. 800 ml output. Per MD Issa, if pt is unable to void in 4 hours, keep marks in place until pt off magnesium.

## 2024-07-26 NOTE — LACTATION NOTE
"This note was copied from a baby's chart.  Lactation Visit:      Hours since Delivery: 17 hours old.    Gestational Age at Delivery: 40.4 weeks.    Visit with Lactation: Mother is a primip with a history of anemia, CHTN, PCOS and is currently on magnesium sulfate for preeclampsia. Since birth, Arnoldo Thao\" has been to breast 5 times, has received 7mL of PDHM via bottle per feeding, has voided x1, stooled x4, and will be weighed at 24 hour  screening. Mother states Stefan is obtaining a deep, comfortable latch while at breast, and he has been sleepy for a few feedings. Upon entering room, Stefan sleeping skin-to-skin with mother. Discussed  feeding behaviors during first few days of life, waking techniques, hunger cues, hand expression, paced bottle feeding, the importance of tracking infants feedings and diaper output, as well as supplementation volumes and pumping if infant isn't going to breast well for feedings. Education given on importance of infant positioning for a deep, comfortable latch for effective milk transfer. Anticipatory guidance given on input/output goals, normal feeding volumes in the  period, and pumping. Use waking techniques to rouse Stefan, he was fussy, and would not calm with soothing techniques, and was not showing hunger cues. Wet diaper was changed by LC, and infant calmed when skin-to-skin with mother; mother guided him down to left breast in cross-cradle hold, hand expressed a few large gtts, Stefan sleepy and not rooting at breast. Infant was guided back skin-to-skin with mother. Encouraged skin-to-skin and to attempt again in a short while, or mother can hand express and it can be offered to Stefan via another route. Discussed mothers risk factors for having a low milk supply, encouraged mother to pump after feedings to help build a more plentiful milk supply; mother verbalized understanding, and RN states mother will be changing rooms soon, and bedside RN will set up " pump in new room. Education given on flange fit and comfort, mother states she has size 15mm flanges at home. Encouraged mother to let primary RN know if she would like lactation to return for feeding assistance or if questions arise. Mother aware of lactation resources available to her after discharging from hospital.     Plan: Skin-to-skin prior to feedings. Continue breastfeeding on-demand and/or every 2-3 hours. Track feedings and diaper output. Mother to pump after feeding to protect milk supply d/t PCOS.     Has Breast Pump for Home: Yes, [employer].    Education given: Stages of milk production after delivery, [place] behaviors during first few days of life, Hunger cues, Breastfeeding positions, How to obtain & maintain a deep, comfortable latch, Listening & watching for swallows, How do I know if my baby is finished & getting enough, Benefits of skin-to-skin prior to feedings, Paced bottle feeding, Importance of tracking all feedings and diaper output, Hand expression, Nutritive vs non-nutritive sucking, Pumping for missed feedings at breast, Burping, Cluster feeding, How to tell if breastfeeding is going well, [place] waking techniques, Banked donor breast milk, and Breast pump use for home.

## 2024-07-26 NOTE — PROGRESS NOTES
Writer assisted primary RN with straight catheterization d/t labial swelling. Able to empty bladder for 800 mL. Dr. Issa in department and updated on current patient status r/t inability to void and swelling. Requested order for magnesium packs. Per provider, if patient is unable to void in 4 hours and bladder scan reveals >150 mL of urine, RN is to place marks. Marks to be removed when magnesium infusion is complete. Writer to place VORB.

## 2024-07-27 PROCEDURE — 250N000013 HC RX MED GY IP 250 OP 250 PS 637: Performed by: OBSTETRICS & GYNECOLOGY

## 2024-07-27 PROCEDURE — 120N000001 HC R&B MED SURG/OB

## 2024-07-27 RX ADMIN — IBUPROFEN 800 MG: 800 TABLET ORAL at 14:50

## 2024-07-27 RX ADMIN — ACETAMINOPHEN 975 MG: 325 TABLET ORAL at 22:51

## 2024-07-27 RX ADMIN — ACETAMINOPHEN 650 MG: 325 TABLET ORAL at 02:45

## 2024-07-27 RX ADMIN — LABETALOL HYDROCHLORIDE 200 MG: 200 TABLET ORAL at 06:45

## 2024-07-27 RX ADMIN — ACETAMINOPHEN 975 MG: 325 TABLET ORAL at 14:50

## 2024-07-27 RX ADMIN — LABETALOL HYDROCHLORIDE 200 MG: 200 TABLET ORAL at 22:51

## 2024-07-27 RX ADMIN — IBUPROFEN 800 MG: 800 TABLET ORAL at 20:36

## 2024-07-27 RX ADMIN — IBUPROFEN 800 MG: 800 TABLET ORAL at 08:30

## 2024-07-27 RX ADMIN — DOCUSATE SODIUM 100 MG: 100 CAPSULE, LIQUID FILLED ORAL at 08:31

## 2024-07-27 RX ADMIN — LABETALOL HYDROCHLORIDE 200 MG: 200 TABLET ORAL at 14:50

## 2024-07-27 RX ADMIN — WITCH HAZEL: 500 SOLUTION RECTAL; TOPICAL at 08:30

## 2024-07-27 RX ADMIN — ACETAMINOPHEN 975 MG: 325 TABLET ORAL at 08:35

## 2024-07-27 RX ADMIN — DOCUSATE SODIUM 100 MG: 100 CAPSULE, LIQUID FILLED ORAL at 20:36

## 2024-07-27 ASSESSMENT — ACTIVITIES OF DAILY LIVING (ADL)
ADLS_ACUITY_SCORE: 20

## 2024-07-27 NOTE — PROGRESS NOTES
24   Stacy Avilez   2000    Postpartum Rounding Note    Subjective: Patient doing well. She is tolerating general diet, urinating without difficulty, and ambulating without lightheadedness. She is breastfeeding. Vaginal bleeding is within normal limits. Denies headache, vision changes, trouble breathing, chest pain, or RUQ pain.    Vital signs:  Temp: 98  F (36.7  C) Temp src: Oral BP: 120/61 Pulse: 82   Resp: 16 SpO2: 98 % O2 Device: None (Room air)        Physical Exam:  General:   no distress  Abdomen: nontender, uterine fundus firm  below umbilicus  Extremities: no calf pain, minimal symmetric edema in legs    Labs: all recent labs reviewed    Assessment/Plan: 24 year old PPD#2 s/p   1. Postpartum    - afebrile, VSS  - continue postpartum cares   2. cHTN with superimposed pre-eclampsia with severe features  - cHTN: since teenager, declined induction at 39 weeks for cHTN, elevated Bps at OB appt               - severe by BPs              - asymptomatic              - BPs 110s-130s/50s-70s overnight              - acute anti-hypertensives                          - labetalol IV 20mg, 40mg (last )              - maintenance anti-hypertensives                          -labetalol 200mg q8h (increased  PM)  -hold if systolic BP <130              - pre-e labs normal               - UOP adequate              - s/p magnesium sulfate for 24 hours postpartum              - monitor Bps, I/Os, and symptoms closely  3. Third degree laceration              - discussed extensively cares including keeping stools soft, sitz baths 2x per day, wet wipes, and donut pillow; also consider PT at 6 weeks PP     Discussed plan of care with patient and , and patient expressed understanding. Opportunity for questions given, and all questions were answered.     Disposition: plan for discharge at least 72 hours PP for BP monitoring (night of )     Tri Issa MD

## 2024-07-27 NOTE — LACTATION NOTE
This note was copied from a baby's chart.  Lactation follow-up Note:      Hours since Delivery: 1 day 14 hours old.    Gestational Age at Delivery: 40.4 weeks.    Visit with Lactation: In the last 24 hours, Stefan has been to breast 6 times, has received 2mL of MBM via bottle, has voided x2, soiled x2, and had a weight loss of 7.63% (since delivery). Mother states Stefan is continuing to obtain a comfortable latch at breast, and he is staying more active and she has started to hear multiple swallows during feedings. Infant sleeping during visit, encouraged family to start skin-to-skin prior to feedings to help Stefan wake, continue to track feedings and diaper output, and that infant should feed 8-12x/day. Mother states she hasn't pumped yet, and was open to LC setting up and oriented her to pump. Breast pump set up and encouraged mother to use 21mm flanges or to hand express if using 21mm flanges are uncomfortable. Encouraged mother to let primary RN know if she would like lactation to return for feeding assistance or if questions arise. Mother aware of lactation resources available to her after discharging from hospital.     Plan: Skin-to-skin prior to feedings. Continue breastfeeding on-demand and/or every 2-3 hours. Track feedings and diaper output. Mother to pump 3-4x/day protect milk supply d/t PCOS. .    Has Breast Pump for Home: Yes.    Education given: How to obtain & maintain a deep, comfortable latch, Listening & watching for swallows, How do I know if my baby is finished & getting enough, Benefits of skin-to-skin prior to feedings, Importance of tracking all feedings and diaper output, Hand expression, Nutritive vs non-nutritive sucking, Pumping for missed feedings at breast, How to tell if breastfeeding is going well, and [place] waking techniques.

## 2024-07-27 NOTE — PLAN OF CARE
"Goal Outcome Evaluation: Progressing    Patient's VSS. Reflexes WDL, no clonus noted; patient denies any headache, vision disturbances, or epigastric pain. Fundus is firm 1 cm below umbilicus with scant lochia. Patient is up ad alicja and performing self-cares independently. Reporting increasing perineal pain being treated with acetaminophen, ibuprofen, tucks, dermoplast, mag packs, ice, heat, tub soak, and rest. Patient denies wanting any other pain medications at this time. Patient's  is in the room with her and infant, attentive/supportive and participating in cares.    /68 (BP Location: Left arm, Patient Position: Semi-Lopez's, Cuff Size: Adult Regular)   Pulse 96   Temp 98.5  F (36.9  C) (Oral)   Resp 16   Ht 1.727 m (5' 8\")   Wt 92.9 kg (204 lb 14.4 oz)   SpO2 99%   Breastfeeding Unknown   BMI 31.15 kg/m      MALU RAMESH RN on 7/26/2024 at 11:06 PM      Problem: Adult Inpatient Plan of Care  Goal: Optimal Comfort and Wellbeing  Outcome: Progressing     Problem: Hypertensive Disorders in Pregnancy  Goal: Patient Stabilization  Outcome: Progressing             "

## 2024-07-27 NOTE — PROGRESS NOTES
Stacy's magnesium infusion stopped at 18:56 per orders. Reviewed use of mag packs for perineal swelling and discomfort, patient instructed to call RN if she would like to use it. Encouraged pumping at next feed.

## 2024-07-27 NOTE — PLAN OF CARE
Goal Outcome Evaluation:      Plan of Care Reviewed With: patient    Overall Patient Progress: improving    Outcome Evaluation: VSS. Breastfeeding Q 2-3 hours. Progressing towards discharge.    VSS. Labetalol administered per orders. Pt denies all symptoms of preeclampsia. No clonus. Fundus firm. Pt reports tolerable pain control with, icepack, ibuprofen and tylenol use. Ambulating independently in the room. Breastfeeding with good latch observed. Attentive to infant cues.

## 2024-07-27 NOTE — PLAN OF CARE
Problem: Breastfeeding  Goal: Effective Breastfeeding  Intervention: Promote Effective Breastfeeding  Recent Flowsheet Documentation  Taken 7/26/2024 2347 by Nataly Avila, RN  Parent-Child Attachment Promotion:   caring behavior modeled   cue recognition promoted   face-to-face positioning promoted   interaction encouraged   parent/caregiver presence encouraged   participation in care promoted   positive reinforcement provided   skin-to-skin contact encouraged   strengths emphasized     Problem: Postpartum (Vaginal Delivery)  Goal: Optimal Pain Control and Function  Outcome: Progressing  Intervention: Prevent or Manage Pain  Recent Flowsheet Documentation  Taken 7/27/2024 0250 by Nataly Avila, RN  Pain Management Interventions: medication (see MAR)  Taken 7/26/2024 2347 by Nataly Avila, RN  Pain Management Interventions: cold applied         Goal Outcome Evaluation:      Plan of Care Reviewed With: patient, spouse    Overall Patient Progress: improving    Outcome Evaluation: Patients VSS, voiding, ambulating, rating pain 3/10 tylenol given, breastfeeding independently      Nataly Avila RN on 7/27/2024 at 3:33 AM

## 2024-07-28 VITALS
HEIGHT: 68 IN | DIASTOLIC BLOOD PRESSURE: 86 MMHG | OXYGEN SATURATION: 99 % | RESPIRATION RATE: 18 BRPM | HEART RATE: 85 BPM | BODY MASS INDEX: 30.45 KG/M2 | TEMPERATURE: 99 F | SYSTOLIC BLOOD PRESSURE: 154 MMHG | WEIGHT: 200.9 LBS

## 2024-07-28 PROCEDURE — 250N000013 HC RX MED GY IP 250 OP 250 PS 637: Performed by: OBSTETRICS & GYNECOLOGY

## 2024-07-28 RX ORDER — LABETALOL 200 MG/1
400 TABLET, FILM COATED ORAL ONCE
Status: COMPLETED | OUTPATIENT
Start: 2024-07-28 | End: 2024-07-28

## 2024-07-28 RX ORDER — LABETALOL 200 MG/1
200-400 TABLET, FILM COATED ORAL EVERY 8 HOURS
Qty: 180 TABLET | Refills: 1 | Status: SHIPPED | OUTPATIENT
Start: 2024-07-28

## 2024-07-28 RX ORDER — IBUPROFEN 800 MG/1
800 TABLET, FILM COATED ORAL EVERY 8 HOURS PRN
Qty: 30 TABLET | Refills: 1 | Status: SHIPPED | OUTPATIENT
Start: 2024-07-28

## 2024-07-28 RX ORDER — LABETALOL 200 MG/1
200-400 TABLET, FILM COATED ORAL EVERY 8 HOURS SCHEDULED
Status: DISCONTINUED | OUTPATIENT
Start: 2024-07-28 | End: 2024-07-28 | Stop reason: HOSPADM

## 2024-07-28 RX ADMIN — LABETALOL HYDROCHLORIDE 200 MG: 200 TABLET ORAL at 06:45

## 2024-07-28 RX ADMIN — LABETALOL HYDROCHLORIDE 400 MG: 200 TABLET ORAL at 13:22

## 2024-07-28 RX ADMIN — IBUPROFEN 800 MG: 800 TABLET ORAL at 17:17

## 2024-07-28 RX ADMIN — ACETAMINOPHEN 650 MG: 325 TABLET ORAL at 17:18

## 2024-07-28 RX ADMIN — IBUPROFEN 800 MG: 800 TABLET ORAL at 08:57

## 2024-07-28 RX ADMIN — DOCUSATE SODIUM 100 MG: 100 CAPSULE, LIQUID FILLED ORAL at 08:57

## 2024-07-28 RX ADMIN — ACETAMINOPHEN 975 MG: 325 TABLET ORAL at 06:56

## 2024-07-28 RX ADMIN — IBUPROFEN 800 MG: 800 TABLET ORAL at 03:06

## 2024-07-28 RX ADMIN — LABETALOL HYDROCHLORIDE 400 MG: 200 TABLET ORAL at 20:23

## 2024-07-28 RX ADMIN — DOCUSATE SODIUM 100 MG: 100 CAPSULE, LIQUID FILLED ORAL at 20:23

## 2024-07-28 ASSESSMENT — ACTIVITIES OF DAILY LIVING (ADL)
ADLS_ACUITY_SCORE: 20

## 2024-07-28 NOTE — LACTATION NOTE
"This note was copied from a baby's chart.  Lactation follow-up Note:      Hours since Delivery: 2 days 13 hours old.    Gestational Age at Delivery: 40.4 weeks.    Visit with Lactation: In the last 24 hours, infant has been to breast 8 times, has voided x4, soiled x4, and had a weight loss of 8.74% (since delivery). Upon entering room, Stefan breastfeeding in cross-cradle hold on right breast, rhythmic sucking pattern noted, swallows heard 5:1, and mother reports latch is comfortable. Mother states she will try to be more responsive to infant during feeding time, and right now she is not worried about his feedings and weight. Reviewed how to know if Stefan is breastfeeding well, as well as expected diaper output for first few days of life. Provided family with \"Latch Care Plan\" (see below), which includes supplementation volumes during first few days of life. Encouraged family to start skin-to-skin prior to feeding time, burp or change diaper between breasts if Stefan becomes sleepy after one breast, as well as using other waking techniques. Mother states she is starting to feel her breasts fill. Engorgement and reverse pressure softening education provided. Encouraged family to continue tracking feedings and diaper output, to let primary RN know if she would like lactation to return for feeding assistance or if questions arise. Mother aware of lactation resources available to her after discharging from hospital.     Plan: Skin-to-skin prior to feedings. Continue breastfeeding on-demand and/or every 2-3 hours. Supplement PRN. Track feedings and diaper output. Mother to pump 3-4x/day protect milk supply d/t PCOS.    Has Breast Pump for Home: Yes.    Education given: Stages of milk production after delivery, Hunger cues, Listening & watching for swallows, How do I know if my baby is finished & getting enough, Benefits of skin-to-skin prior to feedings, Paced bottle feeding, Importance of tracking all feedings and diaper " output, Engorgement, Reverse pressure softening, How to tell if breastfeeding is going well, Supplementation volumes during first few days of life, and [place] waking techniques.    Latch     Place baby skin-to-skin on mother's chest up to a hour before feeding.   Attempt breastfeeding on infant's early hunger cues (hand in mouth, rooting).  Position baby in cross-cradle or football hold, which may help achieve latch.   If latched, watch for rhythmic sucking and occasional swallows.  Limit latch attempts to 5-10 minutes.  If latch difficulty or few/no swallows noted:  Hand express colostrum and offer via spoon before or after feeding attempt to increase baby's energy level.  Pump breasts for 15 minutes to stimulate milk production.   Feed expressed milk to baby using the amounts below as a guideline, give more as baby cues.  If necessary, make up the difference with donor milk or formula as a bridge until milk supply increases:    24-48 hours   5-15 ml each feeding  48-72 hours   15-30 ml each feeding  72-96 hours   30-60 ml each feeding   Follow Pediatrician Recommendations

## 2024-07-28 NOTE — PLAN OF CARE
Goal Outcome Evaluation:      Plan of Care Reviewed With: patient    Overall Patient Progress: improving    Outcome Evaluation: VSS, ex intermittently hypertensive, MD notified.    VSS, ex intermittent hypertension. 400 mg oral labetalol administered per orders, see previous note. Pt denies all other symptoms of preeclampsia. No clonus. Pt states she has a bp cuff at home. Breastfeeding with good latch observed. Continuing to encourage feedings Q 2-3 hours, pt verbalized understanding. Pt reports tolerable pain control with ibuprofen and tylenol use. Attentive to infant cues.

## 2024-07-28 NOTE — PROGRESS NOTES
Report given to ARNAV Gillis to listen to lungs and finish doing reflexes. Writer transferring to labor nursing.

## 2024-07-28 NOTE — PLAN OF CARE
Problem: Postpartum (Vaginal Delivery)  Goal: Successful Parent Role Transition  Outcome: Progressing  Intervention: Support Parent Role Transition  Recent Flowsheet Documentation  Taken 7/27/2024 2335 by Karin Campbell RN  Supportive Measures: active listening utilized  Parent-Child Attachment Promotion: caring behavior modeled  Goal: Hemostasis  Outcome: Progressing  Goal: Absence of Infection Signs and Symptoms  Outcome: Progressing  Goal: Optimal Pain Control and Function  Outcome: Progressing  Intervention: Prevent or Manage Pain  Recent Flowsheet Documentation  Taken 7/27/2024 2335 by Karin Campbell RN  Pain Management Interventions: declines   Data: Vital signs within normal limits. Postpartum checks within normal limits.   Patient eating and drinking normally. Patient able to empty bladder independently and is up ambulating. No apparent signs of infection.  3rd degree laceration  healing well. Patient performing self cares and is able to care for infant.  Action: Patient medicated with Ibuprofen during the shift for perineum pain. Patient reassessed within 1 hour after medication and patient was sleeping.  Response: Positive attachment behaviors observed with infant. Support persons FOB present.   Plan: Anticipate discharge today.

## 2024-07-28 NOTE — PROVIDER NOTIFICATION
Notified Dr. Issa that the pt's bp recheck 1 hour after 400mg po labetalol was administered was 139/76.     Per Dr. Issa: Please update discharge instructions for labetalol dosing to  (200mg if systolic 130-139, 400mg if systolic 140 or greater, hold if systolic BP <125). No additional interventions at this time.

## 2024-07-28 NOTE — PROVIDER NOTIFICATION
Notified Dr. Issa that the pt's BP was 148/85 FYI. Discharge orders already placed. Pt has schedule dose of labetalol due at 1500. Per sliding scale order, it says to give 200mg dose. Also, pt does not have patellar reflex.     Per Dr. Issa: Give 400mg po labetalol dose now. Do not give additional dose of labetalol at 1500. Recheck a bp in 1 hour after labetalol administration and call with bp result. If pt's bp is not in severe range, pt may still be able to go home this evening. Have pt check pt at around 6am, 2pm and 10 pm once discharged and at home.

## 2024-07-28 NOTE — PLAN OF CARE
"Goal Outcome Evaluation: Progressing      Plan of Care Reviewed With: patient    Overall Patient Progress: improvingOverall Patient Progress: improving    Patient's VSS, except for BP which has been intermittently slightly elevated. Reflexes WDL, no clonus noted; patient denies any headache, vision disturbances, or epigastric pain. Fundus is firm 1 cm below umbilicus with scant lochia. Patient is up ad alicja and performing self-cares independently. Reporting perineal pain being treated with acetaminophen, ibuprofen, tucks, dermoplast, mag packs, and ice. Patient's  is in the room with her and infant, attentive/supportive and participating in cares.    /65 (BP Location: Right arm, Patient Position: Semi-Lopez's, Cuff Size: Adult Regular)   Pulse 78   Temp 98.1  F (36.7  C) (Oral)   Resp 18   Ht 1.727 m (5' 8\")   Wt 93.5 kg (206 lb 2.1 oz)   SpO2 99%   Breastfeeding Unknown   BMI 31.34 kg/m      MALU RAMESH RN on 7/27/2024 at 10:16 PM              "

## 2024-07-28 NOTE — DISCHARGE SUMMARY
24   Stacy Avilez   2000    Discharge summary    Admit date: 2024     Discharge date: 24       Admit diagnoses:  1. cHTN with superimposed pre-eclampsia with severe features    2. IUP at 40 weeks 2 days  3. Marginal Cord Insertion     Discharge diagnoses:  Same plus s/p ; repair of 3rd degree laceration     Hospital course:   The patient was admitted at 40 weeks and 2 days for [facility] for cHTN with superimposed pre-eclampsia with severe features.  The patient had a male infant by  at 40 weeks and 4 days; see delivery note.  After , she was tolerating a normal diet, urinating without difficulty, ambulating without lightheadedness, and her vaginal bleeding was within normal limits for postpartum.  She was discharged to home on postpartum day 3.    Rubella immune  Rh +    Other problems addressed:  1. cHTN with superimposed pre-eclampsia with severe features  - cHTN: since teenager, declined induction at 39 weeks for cHTN, elevated Bps at OB appt               - severe by BPs              - asymptomatic              - BPs 130s-150s/60s-80s overnight              - acute anti-hypertensives                          - labetalol IV 20mg, 40mg (last )              - maintenance anti-hypertensives                          -labetalol 200mg-400 q8h (increased )  -- 200mg if systolic 130-139  -- 400mg if systolic 140 or greater  -- hold if systolic BP <125               - pre-e labs normal               - UOP adequate              - s/p magnesium sulfate for 24 hours postpartum              - monitor Bps, I/Os, and symptoms closely  2. Third degree laceration              - discussed extensively cares including keeping stools soft, sitz baths 2x per day, wet wipes, and donut pillow; also consider PT at 6 weeks PP      Disposition: Discharged to home with follow-up in office in 1-2 weeks and 6 weeks.      Tri Issa MD

## 2024-07-28 NOTE — PROGRESS NOTES
24   Stacy Avilez   2000    Postpartum Rounding Note    Subjective: Patient doing well. She is tolerating general diet, urinating without difficulty, and ambulating without lightheadedness. She is breastfeeding. Vaginal bleeding is within normal limits. Denies headache, vision changes, trouble breathing, chest pain, or RUQ pain.      Vital signs:  Temp: 98.1  F (36.7  C) Temp src: Oral BP: 131/81 Pulse: 78   Resp: 18 SpO2: 97 % O2 Device: None (Room air)        Physical Exam:  General:  no distress  Abdomen: nontender, uterine fundus firm  below umbilicus,  Extremities: no calf pain, minimal symmetric edema in legs    Labs: all recent labs reviewed      Assessment/Plan: 24 year old PPD#3 s/p   1. Postpartum    - afebrile, VSS   - continue postpartum cares   2. cHTN with superimposed pre-eclampsia with severe features  - cHTN: since teenager, declined induction at 39 weeks for cHTN, elevated Bps at OB appt               - severe by BPs              - asymptomatic              - BPs 130s-150s/60s-80s overnight              - acute anti-hypertensives                          - labetalol IV 20mg, 40mg (last )              - maintenance anti-hypertensives                          -labetalol 200mg-400 q8h (increased )     - 200mg if systolic 130-149     - 400mg if systolic 150 or greater  -hold if systolic BP <125              - pre-e labs normal               - UOP adequate              - s/p magnesium sulfate for 24 hours postpartum              - monitor Bps, I/Os, and symptoms closely  3. Third degree laceration              - discussed extensively cares including keeping stools soft, sitz baths 2x per day, wet wipes, and donut pillow; also consider PT at 6 weeks PP     Discussed plan of care with patient and , and patient expressed understanding. Opportunity for questions given, and all questions were answered.     Disposition: plan for discharge later today if Bps well  controlled (at least 72 hours PP for BP monitoring)     Tri Issa MD

## 2024-07-28 NOTE — DISCHARGE INSTRUCTIONS
Congratulations on the birth of your baby!    Please call if your bleeding is bright red more than a pad an hour and doesn't seem to be slowing down. Please call if your temperature goes above 100 degrees. If you have stitches, soaking in a warm, soapy bathtub once or twice a day the first week you are home will keep the area clean and help it heal.     If you do not have regular bowel movements, please take a stool softener such as colace (docusate sodium) 100mg one to two times per day until you have at least one regular soft bowel movement per day. If you do not have a regular bowel movement with just the stool softener, then add miralax and/or milk of magnesia until you are having at least one regular soft bowel movement per day.     You may use Tylenol 1000mg every 8 hours and/or Ibuprofen 800mg every 8 hours for pain.     Recommend taking prenatal vitamin, vitamin D 2000 units/day, and fish oil with DHA while you are breast-feeding. Assure you are eating adequate amounts of protein (about 75g per day if breastfeeding without supplementation during the first 6 months of your baby's life) as well as healthy fats (avocado, olive oil, avocado oil, coconut oil) while breast-feeding. Drink at least 3 quarts of water per day spread out throughout the day.       Call [phone] for questions during the day and for emergencies on nights and weekends.    Please make an appointment in  2 weeks for BP and laceration check and 6 weeks for post-delivery check.      [facility] Blood Pressure Instructions After Delivery:    Check BP   - Take your blood pressure at least 3 times per day (morning, midday, and evening) and record the readings. Make sure that you are seated with your legs uncrossed for at least 15 minutes and that you have not had any caffeine in the last 30 minutes.  - Check blood pressure before taking your medication  -- If the top number is less than 125, do not take the medication  -- 200mg if systolic  130-139  -- 400mg if systolic 140 or greater  -- hold if systolic BP <125   -- If the top number (systolic) is over 140, take the medication and recheck 1 hour later, if it remains above 140 call the office.   -- If the top number is over 160 or the bottom number is over 110, recheck your blood pressure, and if it is still this high, go to the emergency room right away.    Pre-eclampsia symptoms  - Please call right away if you have a headache that does not go away with Tylenol and Ibuprofen, vision changes, trouble breathing, chest pain, or pain in your right side or underneath your sternum.

## 2024-07-29 NOTE — PLAN OF CARE
"  Problem: Adult Inpatient Plan of Care  Goal: Plan of Care Review  Description: The Plan of Care Review/Shift note should be completed every shift.  The Outcome Evaluation is a brief statement about your assessment that the patient is improving, declining, or no change.  This information will be displayed automatically on your shift  note.  Outcome: Met     Problem: Adult Inpatient Plan of Care  Goal: Patient-Specific Goal (Individualized)  Description: You can add care plan individualizations to a care plan. Examples of Individualization might be:  \"Parent requests to be called daily at 9am for status\", \"I have a hard time hearing out of my right ear\", or \"Do not touch me to wake me up as it startles  me\".  Outcome: Met   Goal Outcome Evaluation:                    Discharge instructions provided and reviewed. Pt ok to discharge home per Dr Issa. Discharge instructions provided and reviewed. Addressed any questions and concerns. Education completed. Pt discharging home with spouse and infant. Aide will assist pt off the unit.     D:  Patient desires discharge home.  Discharge orders received and entered by provider.  A:  Discharge instructions reviewed with the patient.  All questions and concerns addressed.  R:  Discharge criteria met.  4 Part ID bands double checked.  Severy discharged in car seat with parents.  The nursing assistant escorted patient to car .     "

## 2024-11-09 ENCOUNTER — HEALTH MAINTENANCE LETTER (OUTPATIENT)
Age: 24
End: 2024-11-09

## (undated) RX ORDER — TRIAMCINOLONE ACETONIDE 40 MG/ML
INJECTION, SUSPENSION INTRA-ARTICULAR; INTRAMUSCULAR
Status: DISPENSED
Start: 2019-10-31

## (undated) RX ORDER — LIDOCAINE HYDROCHLORIDE 10 MG/ML
INJECTION, SOLUTION EPIDURAL; INFILTRATION; INTRACAUDAL; PERINEURAL
Status: DISPENSED
Start: 2019-10-31

## (undated) RX ORDER — BUPIVACAINE HYDROCHLORIDE 2.5 MG/ML
INJECTION, SOLUTION EPIDURAL; INFILTRATION; INTRACAUDAL
Status: DISPENSED
Start: 2019-10-31